# Patient Record
Sex: FEMALE | Race: WHITE | URBAN - METROPOLITAN AREA
[De-identification: names, ages, dates, MRNs, and addresses within clinical notes are randomized per-mention and may not be internally consistent; named-entity substitution may affect disease eponyms.]

---

## 2017-05-27 ENCOUNTER — EMERGENCY (EMERGENCY)
Facility: HOSPITAL | Age: 27
LOS: 0 days | Discharge: HOME | End: 2017-05-27
Admitting: PEDIATRICS

## 2017-06-28 DIAGNOSIS — S91.131A PUNCTURE WOUND WITHOUT FOREIGN BODY OF RIGHT GREAT TOE WITHOUT DAMAGE TO NAIL, INITIAL ENCOUNTER: ICD-10-CM
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
## 2017-06-28 DIAGNOSIS — G40.909 EPILEPSY, UNSPECIFIED, NOT INTRACTABLE, WITHOUT STATUS EPILEPTICUS: ICD-10-CM
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
## 2018-05-23 ENCOUNTER — OUTPATIENT (OUTPATIENT)
Dept: OUTPATIENT SERVICES | Facility: HOSPITAL | Age: 28
LOS: 1 days | Discharge: HOME | End: 2018-05-23

## 2018-05-23 DIAGNOSIS — R63.2 POLYPHAGIA: ICD-10-CM

## 2018-05-23 DIAGNOSIS — Z00.01 ENCOUNTER FOR GENERAL ADULT MEDICAL EXAMINATION WITH ABNORMAL FINDINGS: ICD-10-CM

## 2018-06-05 ENCOUNTER — OUTPATIENT (OUTPATIENT)
Dept: OUTPATIENT SERVICES | Facility: HOSPITAL | Age: 28
LOS: 1 days | Discharge: HOME | End: 2018-06-05

## 2018-06-05 DIAGNOSIS — R94.5 ABNORMAL RESULTS OF LIVER FUNCTION STUDIES: ICD-10-CM

## 2018-06-07 ENCOUNTER — TRANSCRIPTION ENCOUNTER (OUTPATIENT)
Age: 28
End: 2018-06-07

## 2018-06-14 ENCOUNTER — OUTPATIENT (OUTPATIENT)
Dept: OUTPATIENT SERVICES | Facility: HOSPITAL | Age: 28
LOS: 1 days | Discharge: HOME | End: 2018-06-14

## 2018-06-14 DIAGNOSIS — G04.91 MYELITIS, UNSPECIFIED: ICD-10-CM

## 2018-08-02 ENCOUNTER — OUTPATIENT (OUTPATIENT)
Dept: OUTPATIENT SERVICES | Facility: HOSPITAL | Age: 28
LOS: 1 days | Discharge: HOME | End: 2018-08-02

## 2018-08-02 DIAGNOSIS — R94.5 ABNORMAL RESULTS OF LIVER FUNCTION STUDIES: ICD-10-CM

## 2018-08-02 DIAGNOSIS — E55.9 VITAMIN D DEFICIENCY, UNSPECIFIED: ICD-10-CM

## 2018-08-02 DIAGNOSIS — R63.2 POLYPHAGIA: ICD-10-CM

## 2019-04-30 ENCOUNTER — TRANSCRIPTION ENCOUNTER (OUTPATIENT)
Age: 29
End: 2019-04-30

## 2019-07-14 ENCOUNTER — TRANSCRIPTION ENCOUNTER (OUTPATIENT)
Age: 29
End: 2019-07-14

## 2020-04-25 ENCOUNTER — MESSAGE (OUTPATIENT)
Age: 30
End: 2020-04-25

## 2020-05-09 ENCOUNTER — APPOINTMENT (OUTPATIENT)
Dept: DISASTER EMERGENCY | Facility: HOSPITAL | Age: 30
End: 2020-05-09

## 2020-05-09 LAB
SARS-COV-2 IGG SERPL IA-ACNC: 0.1 INDEX
SARS-COV-2 IGG SERPL QL IA: NEGATIVE

## 2021-05-24 ENCOUNTER — INPATIENT (INPATIENT)
Facility: HOSPITAL | Age: 31
LOS: 3 days | Discharge: HOME | End: 2021-05-28
Attending: OBSTETRICS & GYNECOLOGY | Admitting: OBSTETRICS & GYNECOLOGY
Payer: COMMERCIAL

## 2021-05-24 VITALS
DIASTOLIC BLOOD PRESSURE: 79 MMHG | HEIGHT: 66 IN | WEIGHT: 210.1 LBS | TEMPERATURE: 97 F | SYSTOLIC BLOOD PRESSURE: 123 MMHG | RESPIRATION RATE: 20 BRPM | HEART RATE: 96 BPM

## 2021-05-24 DIAGNOSIS — O36.5930 MATERNAL CARE FOR OTHER KNOWN OR SUSPECTED POOR FETAL GROWTH, THIRD TRIMESTER, NOT APPLICABLE OR UNSPECIFIED: ICD-10-CM

## 2021-05-24 DIAGNOSIS — O61.0 FAILED MEDICAL INDUCTION OF LABOR: ICD-10-CM

## 2021-05-24 DIAGNOSIS — Z28.09 IMMUNIZATION NOT CARRIED OUT BECAUSE OF OTHER CONTRAINDICATION: ICD-10-CM

## 2021-05-24 DIAGNOSIS — Z98.890 OTHER SPECIFIED POSTPROCEDURAL STATES: Chronic | ICD-10-CM

## 2021-05-24 DIAGNOSIS — Z3A.39 39 WEEKS GESTATION OF PREGNANCY: ICD-10-CM

## 2021-05-24 LAB
APPEARANCE UR: CLEAR — SIGNIFICANT CHANGE UP
BASOPHILS # BLD AUTO: 0.01 K/UL — SIGNIFICANT CHANGE UP (ref 0–0.2)
BASOPHILS NFR BLD AUTO: 0.1 % — SIGNIFICANT CHANGE UP (ref 0–1)
BILIRUB UR-MCNC: NEGATIVE — SIGNIFICANT CHANGE UP
BLD GP AB SCN SERPL QL: SIGNIFICANT CHANGE UP
COLOR SPEC: SIGNIFICANT CHANGE UP
DIFF PNL FLD: NEGATIVE — SIGNIFICANT CHANGE UP
EOSINOPHIL # BLD AUTO: 0.05 K/UL — SIGNIFICANT CHANGE UP (ref 0–0.7)
EOSINOPHIL NFR BLD AUTO: 0.5 % — SIGNIFICANT CHANGE UP (ref 0–8)
GLUCOSE UR QL: NEGATIVE — SIGNIFICANT CHANGE UP
HCT VFR BLD CALC: 36.5 % — LOW (ref 37–47)
HGB BLD-MCNC: 12.5 G/DL — SIGNIFICANT CHANGE UP (ref 12–16)
IMM GRANULOCYTES NFR BLD AUTO: 0.3 % — SIGNIFICANT CHANGE UP (ref 0.1–0.3)
KETONES UR-MCNC: NEGATIVE — SIGNIFICANT CHANGE UP
LEUKOCYTE ESTERASE UR-ACNC: NEGATIVE — SIGNIFICANT CHANGE UP
LYMPHOCYTES # BLD AUTO: 1.99 K/UL — SIGNIFICANT CHANGE UP (ref 1.2–3.4)
LYMPHOCYTES # BLD AUTO: 18.4 % — LOW (ref 20.5–51.1)
MCHC RBC-ENTMCNC: 31.4 PG — HIGH (ref 27–31)
MCHC RBC-ENTMCNC: 34.2 G/DL — SIGNIFICANT CHANGE UP (ref 32–37)
MCV RBC AUTO: 91.7 FL — SIGNIFICANT CHANGE UP (ref 81–99)
MONOCYTES # BLD AUTO: 0.74 K/UL — HIGH (ref 0.1–0.6)
MONOCYTES NFR BLD AUTO: 6.8 % — SIGNIFICANT CHANGE UP (ref 1.7–9.3)
NEUTROPHILS # BLD AUTO: 7.99 K/UL — HIGH (ref 1.4–6.5)
NEUTROPHILS NFR BLD AUTO: 73.9 % — SIGNIFICANT CHANGE UP (ref 42.2–75.2)
NITRITE UR-MCNC: NEGATIVE — SIGNIFICANT CHANGE UP
NRBC # BLD: 0 /100 WBCS — SIGNIFICANT CHANGE UP (ref 0–0)
PH UR: 7 — SIGNIFICANT CHANGE UP (ref 5–8)
PLATELET # BLD AUTO: 215 K/UL — SIGNIFICANT CHANGE UP (ref 130–400)
PRENATAL SYPHILIS TEST: SIGNIFICANT CHANGE UP
PROT UR-MCNC: NEGATIVE — SIGNIFICANT CHANGE UP
RBC # BLD: 3.98 M/UL — LOW (ref 4.2–5.4)
RBC # FLD: 12.8 % — SIGNIFICANT CHANGE UP (ref 11.5–14.5)
SP GR SPEC: 1 — LOW (ref 1.01–1.03)
UROBILINOGEN FLD QL: SIGNIFICANT CHANGE UP
WBC # BLD: 10.81 K/UL — HIGH (ref 4.8–10.8)
WBC # FLD AUTO: 10.81 K/UL — HIGH (ref 4.8–10.8)

## 2021-05-24 RX ORDER — DINOPROSTONE 10 MG/241MG
10 INSERT VAGINAL ONCE
Refills: 0 | Status: COMPLETED | OUTPATIENT
Start: 2021-05-24 | End: 2021-05-24

## 2021-05-24 RX ORDER — OXYTOCIN 10 UNIT/ML
333.33 VIAL (ML) INJECTION
Qty: 20 | Refills: 0 | Status: DISCONTINUED | OUTPATIENT
Start: 2021-05-24 | End: 2021-05-28

## 2021-05-24 RX ORDER — SODIUM CHLORIDE 9 MG/ML
1000 INJECTION, SOLUTION INTRAVENOUS
Refills: 0 | Status: DISCONTINUED | OUTPATIENT
Start: 2021-05-24 | End: 2021-05-27

## 2021-05-24 RX ADMIN — DINOPROSTONE 10 MILLIGRAM(S): 10 INSERT VAGINAL at 22:31

## 2021-05-24 NOTE — OB PROVIDER H&P - ASSESSMENT
31yo  at 39w1d, GBS negative, IOL for IUGR wtith normal dopplers, Last EFW on , 2604g, 5%.   -s/p negative covid swabs outpatient   -Admit to L+D  -Monitor EFM and TOCO   -IVF and labs  -Pain control PRN  -Clear liquid diet as tolerated  -IOL with cervidil     Dr. Weinberg aware

## 2021-05-24 NOTE — OB PROVIDER H&P - NSHPPHYSICALEXAM_GEN_ALL_CORE
T(C): 36.2 (05-24-21 @ 21:32), Max: 36.2 (05-24-21 @ 21:32)  HR: 96 (05-24-21 @ 21:32) (96 - 96)  BP: 123/79 (05-24-21 @ 21:32) (123/79 - 123/79)  RR: 20 (05-24-21 @ 21:32) (20 - 20)  BMI (kg/m2): 33.9 (05-24-21 @ 21:32)    Gen: A+OX3. NAD  Abd: Soft, Nontender. Gravid.  FHR: 150bpm /mod tatyana/accels+  TOCO: none  SVE:  C/L/P    EFW by Leopolds: 3000g

## 2021-05-24 NOTE — OB PROVIDER H&P - HISTORY OF PRESENT ILLNESS
31yo  at 39w1d, dated by 1st trimester sonogram, GBS negative, IOL at term. Denies VB, LOF ctx and reports good FM. Pregnancy complicated by IUGR, normal dopplers. Last EFW on , 2604g, 5%.

## 2021-05-24 NOTE — OB PROVIDER H&P - NSHPLABSRESULTS_GEN_ALL_CORE
Rubella immune  Varicella immune  VDRL Negative  HIV NR  Hep B NR  GBS negative  s/p TDAP   O Positive @36w6d: EFW 6%, Normal dopplers, normal fluid   @39w1d: IUGR 2604g, 5%, vertex    Rubella immune  Varicella immune  VDRL Negative  HIV NR  Hep B NR    5/6/21 GBS negative  s/p TDAP   O Positive    GCT 94      5/22 COVID 19 PCR negative  Partner COVID 19 PCR Negative

## 2021-05-25 LAB
AMPHET UR-MCNC: NEGATIVE — SIGNIFICANT CHANGE UP
BARBITURATES UR SCN-MCNC: NEGATIVE — SIGNIFICANT CHANGE UP
BENZODIAZ UR-MCNC: NEGATIVE — SIGNIFICANT CHANGE UP
BUPRENORPHINE SCREEN, URINE RESULT: NEGATIVE — SIGNIFICANT CHANGE UP
COCAINE METAB.OTHER UR-MCNC: NEGATIVE — SIGNIFICANT CHANGE UP
FENTANYL UR QL: NEGATIVE — SIGNIFICANT CHANGE UP
L&D DRUG SCREEN, URINE: SIGNIFICANT CHANGE UP
METHADONE UR-MCNC: NEGATIVE — SIGNIFICANT CHANGE UP
OPIATES UR-MCNC: NEGATIVE — SIGNIFICANT CHANGE UP
OXYCODONE UR-MCNC: NEGATIVE — SIGNIFICANT CHANGE UP
PCP UR-MCNC: NEGATIVE — SIGNIFICANT CHANGE UP
PROPOXYPHENE QUALITATIVE URINE RESULT: NEGATIVE — SIGNIFICANT CHANGE UP

## 2021-05-25 RX ORDER — DINOPROSTONE 10 MG/241MG
10 INSERT VAGINAL ONCE
Refills: 0 | Status: COMPLETED | OUTPATIENT
Start: 2021-05-25 | End: 2021-05-25

## 2021-05-25 RX ADMIN — SODIUM CHLORIDE 125 MILLILITER(S): 9 INJECTION, SOLUTION INTRAVENOUS at 07:21

## 2021-05-25 RX ADMIN — DINOPROSTONE 10 MILLIGRAM(S): 10 INSERT VAGINAL at 12:02

## 2021-05-25 NOTE — PROGRESS NOTE ADULT - SUBJECTIVE AND OBJECTIVE BOX
PGY I Note    S: Patient seen and examined at bedside. Doing well, does not desire pain control at this time.    Vital Signs Last 24 Hrs  T(C): 36.2 (24 May 2021 21:32), Max: 36.2 (24 May 2021 21:32)  T(F): 97.2 (24 May 2021 21:32), Max: 97.2 (24 May 2021 21:32)  HR: 67 (25 May 2021 02:18) (67 - 96)  BP: 94/55 (25 May 2021 02:18) (94/55 - 139/65)  RR: 20 (24 May 2021 21:32) (20 - 20)    EFM: 135/mod/+accels  TOCO: irritability  SVE: 0/0/-3 @2230 per Dr. Dyer, cervidil in place    Labs:                        12.5   10.81 )-----------( 215      ( 24 May 2021 21:49 )             36.5             ABO RH Interpretation: O POS (21 @ 21:46)    Urinalysis Basic - ( 24 May 2021 21:50 )    Color: Light Yellow / Appearance: Clear / S.005 / pH: x  Gluc: x / Ketone: Negative  / Bili: Negative / Urobili: <2 mg/dL   Blood: x / Protein: Negative / Nitrite: Negative   Leuk Esterase: Negative / RBC: x / WBC x   Sq Epi: x / Non Sq Epi: x / Bacteria: x    Prenatal Syphilis Test: Nonreact (21 @ 21:49)    UDS: pending  Covid: neg    Meds:  Cervidil @2231

## 2021-05-25 NOTE — PROGRESS NOTE ADULT - SUBJECTIVE AND OBJECTIVE BOX
Pt evaluated at bedside, comfortable at this time.    T(C): 36.7 (21 @ 07:17), Max: 36.7 (21 @ 07:17)  HR: 74 (21 @ 10:19) (67 - 96)  BP: 105/58 (21 @ 10:19) (94/55 - 139/65)  RR: 20 (21 @ 21:32) (20 - 20)    VE: L/C/P  Cervidil Placed  Ctx: none noted  FHR: 145 moderate variablity, Cat I    Meds:   Cervidil #1 @ 2231    A/P: 30y.o.  @ 39.1wks IOL for IUGR, GBS negative  - Continuous efm and toco  - Pain management PRN  - Dr. Russo aware

## 2021-05-25 NOTE — OB PROVIDER LABOR PROGRESS NOTE - ASSESSMENT
@ 41+ weeks for IOL for late term   -for cervidil #2   -discussed options of repeat cervidil vs. pitocin   -r/b reviewed   -will place cervidil #2 after patient eats and ambulates  @ 39 + with IUGR <5%Ile with normal dopplers   Cat 1 FHR  -for cervidil #2   -discussed options of repeat cervidil vs. pitocin   -r/b reviewed   -will place cervidil #2 after patient eats and ambulates

## 2021-05-26 ENCOUNTER — RESULT REVIEW (OUTPATIENT)
Age: 31
End: 2021-05-26

## 2021-05-26 PROCEDURE — 88307 TISSUE EXAM BY PATHOLOGIST: CPT | Mod: 26

## 2021-05-26 RX ORDER — FAMOTIDINE 10 MG/ML
20 INJECTION INTRAVENOUS DAILY
Refills: 0 | Status: DISCONTINUED | OUTPATIENT
Start: 2021-05-26 | End: 2021-05-28

## 2021-05-26 RX ORDER — ACETAMINOPHEN 500 MG
975 TABLET ORAL
Refills: 0 | Status: DISCONTINUED | OUTPATIENT
Start: 2021-05-26 | End: 2021-05-28

## 2021-05-26 RX ORDER — NALOXONE HYDROCHLORIDE 4 MG/.1ML
0.1 SPRAY NASAL
Refills: 0 | Status: DISCONTINUED | OUTPATIENT
Start: 2021-05-26 | End: 2021-05-28

## 2021-05-26 RX ORDER — ONDANSETRON 8 MG/1
4 TABLET, FILM COATED ORAL EVERY 6 HOURS
Refills: 0 | Status: DISCONTINUED | OUTPATIENT
Start: 2021-05-26 | End: 2021-05-28

## 2021-05-26 RX ORDER — MORPHINE SULFATE 50 MG/1
3 CAPSULE, EXTENDED RELEASE ORAL ONCE
Refills: 0 | Status: DISCONTINUED | OUTPATIENT
Start: 2021-05-26 | End: 2021-05-28

## 2021-05-26 RX ORDER — KETOROLAC TROMETHAMINE 30 MG/ML
30 SYRINGE (ML) INJECTION EVERY 6 HOURS
Refills: 0 | Status: DISCONTINUED | OUTPATIENT
Start: 2021-05-26 | End: 2021-05-27

## 2021-05-26 RX ORDER — SIMETHICONE 80 MG/1
80 TABLET, CHEWABLE ORAL EVERY 4 HOURS
Refills: 0 | Status: DISCONTINUED | OUTPATIENT
Start: 2021-05-26 | End: 2021-05-28

## 2021-05-26 RX ORDER — IBUPROFEN 200 MG
600 TABLET ORAL EVERY 6 HOURS
Refills: 0 | Status: DISCONTINUED | OUTPATIENT
Start: 2021-05-26 | End: 2021-05-28

## 2021-05-26 RX ORDER — ACETAMINOPHEN 500 MG
975 TABLET ORAL
Refills: 0 | Status: DISCONTINUED | OUTPATIENT
Start: 2021-05-26 | End: 2021-05-27

## 2021-05-26 RX ORDER — DIPHENHYDRAMINE HCL 50 MG
25 CAPSULE ORAL EVERY 6 HOURS
Refills: 0 | Status: COMPLETED | OUTPATIENT
Start: 2021-05-26 | End: 2022-04-24

## 2021-05-26 RX ORDER — OXYCODONE HYDROCHLORIDE 5 MG/1
5 TABLET ORAL ONCE
Refills: 0 | Status: DISCONTINUED | OUTPATIENT
Start: 2021-05-26 | End: 2021-05-28

## 2021-05-26 RX ORDER — OXYTOCIN 10 UNIT/ML
2 VIAL (ML) INJECTION
Qty: 30 | Refills: 0 | Status: DISCONTINUED | OUTPATIENT
Start: 2021-05-26 | End: 2021-05-26

## 2021-05-26 RX ORDER — DEXAMETHASONE 0.5 MG/5ML
4 ELIXIR ORAL EVERY 6 HOURS
Refills: 0 | Status: DISCONTINUED | OUTPATIENT
Start: 2021-05-26 | End: 2021-05-28

## 2021-05-26 RX ORDER — OXYCODONE HYDROCHLORIDE 5 MG/1
5 TABLET ORAL
Refills: 0 | Status: DISCONTINUED | OUTPATIENT
Start: 2021-05-26 | End: 2021-05-28

## 2021-05-26 RX ORDER — CEFAZOLIN SODIUM 1 G
2000 VIAL (EA) INJECTION ONCE
Refills: 0 | Status: COMPLETED | OUTPATIENT
Start: 2021-05-26 | End: 2021-05-26

## 2021-05-26 RX ORDER — MAGNESIUM HYDROXIDE 400 MG/1
30 TABLET, CHEWABLE ORAL
Refills: 0 | Status: DISCONTINUED | OUTPATIENT
Start: 2021-05-26 | End: 2021-05-28

## 2021-05-26 RX ORDER — OXYTOCIN 10 UNIT/ML
2 VIAL (ML) INJECTION
Qty: 30 | Refills: 0 | Status: DISCONTINUED | OUTPATIENT
Start: 2021-05-26 | End: 2021-05-28

## 2021-05-26 RX ORDER — LANOLIN
1 OINTMENT (GRAM) TOPICAL EVERY 6 HOURS
Refills: 0 | Status: DISCONTINUED | OUTPATIENT
Start: 2021-05-26 | End: 2021-05-28

## 2021-05-26 RX ORDER — IBUPROFEN 200 MG
600 TABLET ORAL EVERY 6 HOURS
Refills: 0 | Status: COMPLETED | OUTPATIENT
Start: 2021-05-26 | End: 2022-04-24

## 2021-05-26 RX ORDER — AZITHROMYCIN 500 MG/1
500 TABLET, FILM COATED ORAL ONCE
Refills: 0 | Status: COMPLETED | OUTPATIENT
Start: 2021-05-26 | End: 2021-05-26

## 2021-05-26 RX ORDER — ENOXAPARIN SODIUM 100 MG/ML
40 INJECTION SUBCUTANEOUS EVERY 24 HOURS
Refills: 0 | Status: DISCONTINUED | OUTPATIENT
Start: 2021-05-27 | End: 2021-05-28

## 2021-05-26 RX ORDER — TETANUS TOXOID, REDUCED DIPHTHERIA TOXOID AND ACELLULAR PERTUSSIS VACCINE, ADSORBED 5; 2.5; 8; 8; 2.5 [IU]/.5ML; [IU]/.5ML; UG/.5ML; UG/.5ML; UG/.5ML
0.5 SUSPENSION INTRAMUSCULAR ONCE
Refills: 0 | Status: DISCONTINUED | OUTPATIENT
Start: 2021-05-26 | End: 2021-05-28

## 2021-05-26 RX ORDER — CITRIC ACID/SODIUM CITRATE 300-500 MG
30 SOLUTION, ORAL ORAL ONCE
Refills: 0 | Status: COMPLETED | OUTPATIENT
Start: 2021-05-26 | End: 2021-05-26

## 2021-05-26 RX ORDER — METOCLOPRAMIDE HCL 10 MG
10 TABLET ORAL ONCE
Refills: 0 | Status: DISCONTINUED | OUTPATIENT
Start: 2021-05-26 | End: 2021-05-28

## 2021-05-26 RX ORDER — OXYTOCIN 10 UNIT/ML
333.33 VIAL (ML) INJECTION
Qty: 20 | Refills: 0 | Status: DISCONTINUED | OUTPATIENT
Start: 2021-05-26 | End: 2021-05-28

## 2021-05-26 RX ADMIN — Medication 100 MILLIGRAM(S): at 20:30

## 2021-05-26 RX ADMIN — Medication 2 MILLIUNIT(S)/MIN: at 17:08

## 2021-05-26 RX ADMIN — AZITHROMYCIN 255 MILLIGRAM(S): 500 TABLET, FILM COATED ORAL at 20:48

## 2021-05-26 RX ADMIN — Medication 2 MILLIUNIT(S)/MIN: at 02:47

## 2021-05-26 NOTE — CHART NOTE - NSCHARTNOTEFT_GEN_A_CORE
Patient at bedside. Cervidil removed    0.5/80/0 and soft on exam     will give patient 1 hour to rest ambulate and shower.    will start pitocin in 1 hour    Dr. Russo aware

## 2021-05-26 NOTE — BRIEF OPERATIVE NOTE - OPERATION/FINDINGS
Normal uterus, tubes and ovaries bilaterally. Viable male infant weighing 5-10 delivered in vertex position, APGARs 9/9. Low transverse uterine incision. Normal uterus, tubes and ovaries bilaterally. Viable male infant weighing 5-10 delivered in vertex position, APGARs 9/9.

## 2021-05-26 NOTE — CHART NOTE - NSCHARTNOTEFT_GEN_A_CORE
Pt seen and examined at bedside.  Cervical exam remains unchanged at 2.5/50/-3.    At this time; risk, benefits, and alternatives were discussed with the patient regarding proceeding with a  versus continuing the induction of labor. Pt understood the risks, alternatives, and benefits of both options and has elected to proceed with a primary          Plan:  -patient on call to OR  -Azizthromycin and Ancef  -NPO  -skin prep  -peds and anesthesia to be made aware  -continuous EFM/toco    Dr. Albarran bedside

## 2021-05-26 NOTE — BRIEF OPERATIVE NOTE - NSICDXBRIEFPOSTOP_GEN_ALL_CORE_FT
POST-OP DIAGNOSIS:  IUGR,  26-May-2021 22:11:50  Haley Baig  Failed induction of labor 26-May-2021 22:11:48  Haley Baig

## 2021-05-26 NOTE — PROGRESS NOTE ADULT - SUBJECTIVE AND OBJECTIVE BOX
PGY1 Note    Patient seen at bedside for labor progression. No complaints at the moment.    T(F): 97.88 ( @ 19:32), Max: 97.88 ( @ 19:32)  HR: 93 ( @ 13:38)  BP: 113/58 ( @ 13:38) (76/45 - 150/61)    EFM: 135/mod tatyana/+accels   TOCO: irregular  SVE: deferred, last exam @06 0.5/80/-2, balloon placed @0720 (by )    Medications:  dinoprostone Insert: 10 ( @ 22:31)  dinoprostone Insert: 10 ( @ 12:02)  lactated ringers.: 125 ( @ 21:24)  oxytocin Infusion.: 2 ( @ 01:20)  cervical ripening balloon @0720    Labs:                        12.5   10.81 )-----------( 215      ( 24 May 2021 21:49 )             36.5             Urinalysis Basic - ( 24 May 2021 21:50 )    Color: Light Yellow / Appearance: Clear / S.005 / pH: x  Gluc: x / Ketone: Negative  / Bili: Negative / Urobili: <2 mg/dL   Blood: x / Protein: Negative / Nitrite: Negative   Leuk Esterase: Negative / RBC: x / WBC x   Sq Epi: x / Non Sq Epi: x / Bacteria: x

## 2021-05-26 NOTE — PROGRESS NOTE ADULT - SUBJECTIVE AND OBJECTIVE BOX
PGY1 Note    Patient seen at bedside for labor progression by . S/P balloon removal. No complaints at the moment.    T(F): 97.88 ( @ 19:32), Max: 97.88 ( @ 19:32)  HR: 80 ( @ 17:28)  BP: 120/75 ( @ 17:28) (76/45 - 150/61)    EFM: 150/mod tatyana/+accels   TOCO: irregular  SVE: 2.5/50/-3/vtx/intact s/p balloon removal (by )    Medications:  dinoprostone Insert: 10 ( @ 22:31)  dinoprostone Insert: 10 ( @ 12:02)  lactated ringers.: 125 ( @ 21:24)  oxytocin Infusion.: 2 ( @ 01:20)  oxytocin Infusion.: 2 ( @ 16:41), currently 6mU/min       Labs:                        12.5   10.81 )-----------( 215      ( 24 May 2021 21:49 )             36.5             Urinalysis Basic - ( 24 May 2021 21:50 )    Color: Light Yellow / Appearance: Clear / S.005 / pH: x  Gluc: x / Ketone: Negative  / Bili: Negative / Urobili: <2 mg/dL   Blood: x / Protein: Negative / Nitrite: Negative   Leuk Esterase: Negative / RBC: x / WBC x   Sq Epi: x / Non Sq Epi: x / Bacteria: x

## 2021-05-26 NOTE — BRIEF OPERATIVE NOTE - NSICDXBRIEFPREOP_GEN_ALL_CORE_FT
PRE-OP DIAGNOSIS:  Failed induction of labor 26-May-2021 22:11:23  Haley Baig  IUGR,  26-May-2021 22:11:44  Haley Baig

## 2021-05-26 NOTE — PROGRESS NOTE ADULT - SUBJECTIVE AND OBJECTIVE BOX
PGY1 Note    Patient seen at bedside for labor progression. Reports more pain, requesting epidural.    T(F): 98.06 ( @ 19:05), Max: 98.06 ( @ 19:05)  HR: 68 ( @ 19:13)  BP: 148/82 ( @ 19:13) (76/45 - 150/61)  RR: 18 ( @ 19:05)    EFM: 145/mod tatyana/+accels   TOCO: q3min   SVE: deferred, last exam @ 2.5/50/-3/vtx/ruptured s/p AROM, clear by     Medications:  dinoprostone Insert: 10 ( @ 22:31)  dinoprostone Insert: 10 ( @ 12:02)  lactated ringers.: 125 ( @ 21:24)  oxytocin Infusion.: 2 ( @ 01:20)  oxytocin Infusion.: 2 ( @ 16:41), currently @8mU/min       Labs:                        12.5   10.81 )-----------( 215      ( 24 May 2021 21:49 )             36.5             Urinalysis Basic - ( 24 May 2021 21:50 )    Color: Light Yellow / Appearance: Clear / S.005 / pH: x  Gluc: x / Ketone: Negative  / Bili: Negative / Urobili: <2 mg/dL   Blood: x / Protein: Negative / Nitrite: Negative   Leuk Esterase: Negative / RBC: x / WBC x   Sq Epi: x / Non Sq Epi: x / Bacteria: x     PGY1 Note    Patient seen at bedside for labor progression. Reports more pain, requesting epidural.    T(F): 98.06 ( @ 19:05), Max: 98.06 ( @ 19:05)  HR: 68 ( @ 19:13)  BP: 148/82 ( @ 19:13) (76/45 - 150/61)  RR: 18 ( @ 19:05)    EFM: 145/mod tatyana/+accels/intermittent variables   TOCO: q3min   SVE: deferred, last exam @ 2.5/50/-3/vtx/ruptured s/p AROM, clear by     Medications:  dinoprostone Insert: 10 ( @ 22:31)  dinoprostone Insert: 10 ( @ 12:02)  lactated ringers.: 125 ( @ 21:24)  oxytocin Infusion.: 2 ( @ 01:20)  oxytocin Infusion.: 2 ( @ 16:41), currently @8mU/min       Labs:                        12.5   10.81 )-----------( 215      ( 24 May 2021 21:49 )             36.5             Urinalysis Basic - ( 24 May 2021 21:50 )    Color: Light Yellow / Appearance: Clear / S.005 / pH: x  Gluc: x / Ketone: Negative  / Bili: Negative / Urobili: <2 mg/dL   Blood: x / Protein: Negative / Nitrite: Negative   Leuk Esterase: Negative / RBC: x / WBC x   Sq Epi: x / Non Sq Epi: x / Bacteria: x

## 2021-05-27 LAB
BASOPHILS # BLD AUTO: 0.01 K/UL — SIGNIFICANT CHANGE UP (ref 0–0.2)
BASOPHILS NFR BLD AUTO: 0.1 % — SIGNIFICANT CHANGE UP (ref 0–1)
EOSINOPHIL # BLD AUTO: 0.01 K/UL — SIGNIFICANT CHANGE UP (ref 0–0.7)
EOSINOPHIL NFR BLD AUTO: 0.1 % — SIGNIFICANT CHANGE UP (ref 0–8)
HCT VFR BLD CALC: 34.2 % — LOW (ref 37–47)
HGB BLD-MCNC: 11.3 G/DL — LOW (ref 12–16)
IMM GRANULOCYTES NFR BLD AUTO: 0.3 % — SIGNIFICANT CHANGE UP (ref 0.1–0.3)
LYMPHOCYTES # BLD AUTO: 1.16 K/UL — LOW (ref 1.2–3.4)
LYMPHOCYTES # BLD AUTO: 9.7 % — LOW (ref 20.5–51.1)
MCHC RBC-ENTMCNC: 31 PG — SIGNIFICANT CHANGE UP (ref 27–31)
MCHC RBC-ENTMCNC: 33 G/DL — SIGNIFICANT CHANGE UP (ref 32–37)
MCV RBC AUTO: 93.7 FL — SIGNIFICANT CHANGE UP (ref 81–99)
MONOCYTES # BLD AUTO: 0.71 K/UL — HIGH (ref 0.1–0.6)
MONOCYTES NFR BLD AUTO: 5.9 % — SIGNIFICANT CHANGE UP (ref 1.7–9.3)
NEUTROPHILS # BLD AUTO: 10.01 K/UL — HIGH (ref 1.4–6.5)
NEUTROPHILS NFR BLD AUTO: 83.9 % — HIGH (ref 42.2–75.2)
NRBC # BLD: 0 /100 WBCS — SIGNIFICANT CHANGE UP (ref 0–0)
PLATELET # BLD AUTO: 193 K/UL — SIGNIFICANT CHANGE UP (ref 130–400)
RBC # BLD: 3.65 M/UL — LOW (ref 4.2–5.4)
RBC # FLD: 13.1 % — SIGNIFICANT CHANGE UP (ref 11.5–14.5)
WBC # BLD: 11.94 K/UL — HIGH (ref 4.8–10.8)
WBC # FLD AUTO: 11.94 K/UL — HIGH (ref 4.8–10.8)

## 2021-05-27 RX ORDER — DIPHENHYDRAMINE HCL 50 MG
25 CAPSULE ORAL EVERY 6 HOURS
Refills: 0 | Status: DISCONTINUED | OUTPATIENT
Start: 2021-05-27 | End: 2021-05-28

## 2021-05-27 RX ORDER — IBUPROFEN 200 MG
600 TABLET ORAL EVERY 6 HOURS
Refills: 0 | Status: DISCONTINUED | OUTPATIENT
Start: 2021-05-27 | End: 2021-05-28

## 2021-05-27 RX ADMIN — Medication 975 MILLIGRAM(S): at 17:50

## 2021-05-27 RX ADMIN — Medication 30 MILLIGRAM(S): at 04:06

## 2021-05-27 RX ADMIN — Medication 30 MILLIGRAM(S): at 03:36

## 2021-05-27 RX ADMIN — SIMETHICONE 80 MILLIGRAM(S): 80 TABLET, CHEWABLE ORAL at 03:33

## 2021-05-27 RX ADMIN — Medication 30 MILLIGRAM(S): at 13:57

## 2021-05-27 RX ADMIN — Medication 975 MILLIGRAM(S): at 11:42

## 2021-05-27 RX ADMIN — SIMETHICONE 80 MILLIGRAM(S): 80 TABLET, CHEWABLE ORAL at 17:55

## 2021-05-27 RX ADMIN — ENOXAPARIN SODIUM 40 MILLIGRAM(S): 100 INJECTION SUBCUTANEOUS at 09:36

## 2021-05-27 RX ADMIN — SIMETHICONE 80 MILLIGRAM(S): 80 TABLET, CHEWABLE ORAL at 13:49

## 2021-05-27 RX ADMIN — Medication 975 MILLIGRAM(S): at 06:48

## 2021-05-27 RX ADMIN — SIMETHICONE 80 MILLIGRAM(S): 80 TABLET, CHEWABLE ORAL at 09:36

## 2021-05-27 RX ADMIN — Medication 30 MILLIGRAM(S): at 09:35

## 2021-05-27 RX ADMIN — Medication 30 MILLIGRAM(S): at 10:00

## 2021-05-27 RX ADMIN — Medication 25 MILLIGRAM(S): at 03:51

## 2021-05-27 RX ADMIN — SIMETHICONE 80 MILLIGRAM(S): 80 TABLET, CHEWABLE ORAL at 06:30

## 2021-05-27 RX ADMIN — Medication 975 MILLIGRAM(S): at 06:29

## 2021-05-27 RX ADMIN — Medication 975 MILLIGRAM(S): at 12:30

## 2021-05-27 RX ADMIN — Medication 30 MILLIGRAM(S): at 14:30

## 2021-05-27 NOTE — PROGRESS NOTE ADULT - SUBJECTIVE AND OBJECTIVE BOX
PGY1 Note:  Patient seen and examined at bedside, recovering well, no acute complaints. Denies fever, chills, chest pain, SOB, nausea, vomiting, LE pain. Pain well-controlled with medication. Minimal bleeding, blanc in place, tolerating PO. Not yet voiding, ambulating, passed flatus.     Physical Exam  Vital Signs Last 24 Hrs  T(C): 36.2 (27 May 2021 01:54), Max: 36.7 (26 May 2021 19:05)  T(F): 97.1 (27 May 2021 01:54), Max: 98.06 (26 May 2021 19:05)  HR: 82 (27 May 2021 01:54) (65 - 108)  BP: 119/59 (27 May 2021 01:54) (95/65 - 152/82) isolated elevated x2  RR: 18 (27 May 2021 01:54) (18 - 18)  SpO2: 99% (27 May 2021 00:00) (92% - 100%)    Gen: AAOx3, NAD  Heart: Normal S1S2, RRR, no m/r/g  Lungs: CTA b/l, no r/r/w   Fundus: firm, below umbilicus   Wound: steris in place c/d/i   Abd: Soft, nontender, nondistended, BS+  Lochia: minimal  Ext: No calf tenderness, no swelling, SCDs in place  UO: ([id number]) 700cc    Labs:                        12.5   10.81 )-----------( 215      ( 24 May 2021 21:49 )             36.5        acetaminophen   Tablet .. 975 milliGRAM(s) Oral <User Schedule>  dexAMETHasone  Injectable 4 milliGRAM(s) IV Push every 6 hours PRN  diphenhydrAMINE 25 milliGRAM(s) Oral every 6 hours PRN  diphtheria/tetanus/pertussis (acellular) Vaccine (ADAcel) 0.5 milliLiter(s) IntraMuscular once  enoxaparin Injectable 40 milliGRAM(s) SubCutaneous every 24 hours  famotidine    Tablet 20 milliGRAM(s) Oral daily  ibuprofen  Tablet. 600 milliGRAM(s) Oral every 6 hours  ibuprofen  Tablet. 600 milliGRAM(s) Oral every 6 hours  ketorolac   Injectable 30 milliGRAM(s) IV Push every 6 hours  lanolin Ointment 1 Application(s) Topical every 6 hours PRN  magnesium hydroxide Suspension 30 milliLiter(s) Oral two times a day PRN  metoclopramide Injectable 10 milliGRAM(s) IV Push once  morphine PF Epidural 3 milliGRAM(s) Epidural once  naloxone Injectable 0.1 milliGRAM(s) IV Push every 3 minutes PRN  ondansetron Injectable 4 milliGRAM(s) IV Push every 6 hours PRN  oxyCODONE    IR 5 milliGRAM(s) Oral every 3 hours PRN  oxyCODONE    IR 5 milliGRAM(s) Oral once PRN  oxyCODONE    IR 5 milliGRAM(s) Oral every 3 hours PRN  oxyCODONE    IR 5 milliGRAM(s) Oral once PRN  oxytocin Infusion 333.333 milliUNIT(s)/Min IV Continuous <Continuous>  oxytocin Infusion 333.333 milliUNIT(s)/Min IV Continuous <Continuous>  oxytocin Infusion. 2 milliUNIT(s)/Min IV Continuous <Continuous>  simethicone 80 milliGRAM(s) Chew every 4 hours

## 2021-05-27 NOTE — PROGRESS NOTE ADULT - ASSESSMENT
30y.o.  @ 39.3wks IOL for IUGR, GBS negative, s/p balloon placement, doing well  -Continue current management   -Pain management prn  -Continuous EFM/toco  -Reevaluate      and  aware    
30y now P1 s/p primary LTCS for failed IOL, POD#1, recovering well     - encourage ambulation, PO hydration  - f/u 1100 CBC   - blanc removed, TOV 1200  - monitor vitals, bleeding   - regular diet  - encourage incentive spirometry   - pain mgmt prn   - simethicone  - routine postop care   - lovenox     Dr. Albarran and Dr. Yancey aware
30y now P1 s/p primary LTCS for failed IOL, POD#1, recovering well     - encourage ambulation, PO hydration  - f/u 1100 CBC   - monitor vitals, bleeding   - regular diet  - encourage incentive spirometry   - pain mgmt prn   - simethicone  - routine postop care   - lovenox     Dr. Albarran and Dr. Dyer aware
30y.o.  @ 39.3wks IOL for IUGR, GBS negative, s/p balloon now removed, on pitocin, doing well   -Continue current management   -Pain management prn  -Continuous EFM/toco  -Reevaluate      and  aware  
29yo  at 39w2d, GBS negative, cervidil in place for IOL for IUGR (5%) with normal dopplers, doing well.    - Cont EFM/[place]  - IV Hydration  - Pain Management prn  - Monitor vitals  - Clear Liquids  - Cervidil in place until  @1030    Dr. Russo and Dr. Dyer aware
30y.o.  @ 39.3wks IOL for IUGR, GBS negative, s/p balloon now removed, on pitocin, s/p AROM, doing well   -Continue current management   -Pain management prn, anesthesia consulted for epidural placement   -Continuous EFM/toco  -Reevaluate      and  aware

## 2021-05-27 NOTE — PROGRESS NOTE ADULT - SUBJECTIVE AND OBJECTIVE BOX
PGY1 Note:  Patient seen and examined at bedside, recovering well, no acute complaints. Denies fever, chills, chest pain, SOB, nausea, vomiting, LE pain. Pain well-controlled with medication. Minimal bleeding, blanc in place, tolerating PO. Not yet voiding, ambulating, passed flatus.     Physical Exam  Vital Signs Last 24 Hrs  T(C): 36.2 (27 May 2021 01:54), Max: 36.7 (26 May 2021 19:05)  T(F): 97.1 (27 May 2021 01:54), Max: 98.06 (26 May 2021 19:05)  HR: 82 (27 May 2021 01:54) (65 - 108)  BP: 119/59 (27 May 2021 01:54) (76/45 - 152/82)  RR: 18 (27 May 2021 01:54) (18 - 18)  SpO2: 99% (27 May 2021 00:00) (92% - 100%)    Gen: AOx3, NAD  Heart: Normal S1S2, RRR, no m/r/g  Lungs: CTA b/l, no r/r/w   Fundus: firm, below umbilicus   Wound: dressing in place c/d/i   Abd: Soft, nontender, nondistended, BS+  Lochia: minimal  Ext: No calf tenderness, no swelling, SCDs in place  UO: ([id number]) 200cc    Labs:                        12.5   10.81 )-----------( 215      ( 24 May 2021 21:49 )             36.5        acetaminophen   Tablet .. 975 milliGRAM(s) Oral <User Schedule>  acetaminophen   Tablet .. 975 milliGRAM(s) Oral <User Schedule>  dexAMETHasone  Injectable 4 milliGRAM(s) IV Push every 6 hours PRN  diphenhydrAMINE 25 milliGRAM(s) Oral every 6 hours PRN  diphtheria/tetanus/pertussis (acellular) Vaccine (ADAcel) 0.5 milliLiter(s) IntraMuscular once  enoxaparin Injectable 40 milliGRAM(s) SubCutaneous every 24 hours  famotidine    Tablet 20 milliGRAM(s) Oral daily  ibuprofen  Tablet. 600 milliGRAM(s) Oral every 6 hours  ibuprofen  Tablet. 600 milliGRAM(s) Oral every 6 hours  ketorolac   Injectable 30 milliGRAM(s) IV Push every 6 hours  lactated ringers. 1000 milliLiter(s) IV Continuous <Continuous>  lanolin Ointment 1 Application(s) Topical every 6 hours PRN  magnesium hydroxide Suspension 30 milliLiter(s) Oral two times a day PRN  metoclopramide Injectable 10 milliGRAM(s) IV Push once  morphine PF Epidural 3 milliGRAM(s) Epidural once  naloxone Injectable 0.1 milliGRAM(s) IV Push every 3 minutes PRN  ondansetron Injectable 4 milliGRAM(s) IV Push every 6 hours PRN  oxyCODONE    IR 5 milliGRAM(s) Oral every 3 hours PRN  oxyCODONE    IR 5 milliGRAM(s) Oral once PRN  oxyCODONE    IR 5 milliGRAM(s) Oral every 3 hours PRN  oxyCODONE    IR 5 milliGRAM(s) Oral once PRN  oxytocin Infusion 333.333 milliUNIT(s)/Min IV Continuous <Continuous>  oxytocin Infusion 333.333 milliUNIT(s)/Min IV Continuous <Continuous>  oxytocin Infusion. 2 milliUNIT(s)/Min IV Continuous <Continuous>  simethicone 80 milliGRAM(s) Chew every 4 hours

## 2021-05-28 ENCOUNTER — TRANSCRIPTION ENCOUNTER (OUTPATIENT)
Age: 31
End: 2021-05-28

## 2021-05-28 VITALS
DIASTOLIC BLOOD PRESSURE: 77 MMHG | RESPIRATION RATE: 18 BRPM | SYSTOLIC BLOOD PRESSURE: 114 MMHG | HEART RATE: 88 BPM | TEMPERATURE: 96 F

## 2021-05-28 LAB
COVID-19 SPIKE DOMAIN AB INTERP: NEGATIVE — SIGNIFICANT CHANGE UP
COVID-19 SPIKE DOMAIN ANTIBODY RESULT: 0.4 U/ML — SIGNIFICANT CHANGE UP
HIV 1+2 AB+HIV1 P24 AG SERPL QL IA: SIGNIFICANT CHANGE UP
SARS-COV-2 IGG+IGM SERPL QL IA: 0.4 U/ML — SIGNIFICANT CHANGE UP
SARS-COV-2 IGG+IGM SERPL QL IA: NEGATIVE — SIGNIFICANT CHANGE UP
SURGICAL PATHOLOGY STUDY: SIGNIFICANT CHANGE UP

## 2021-05-28 RX ORDER — IBUPROFEN 200 MG
1 TABLET ORAL
Qty: 0 | Refills: 0 | DISCHARGE
Start: 2021-05-28

## 2021-05-28 RX ORDER — ACETAMINOPHEN 500 MG
3 TABLET ORAL
Qty: 0 | Refills: 0 | DISCHARGE
Start: 2021-05-28

## 2021-05-28 RX ADMIN — Medication 600 MILLIGRAM(S): at 01:20

## 2021-05-28 RX ADMIN — Medication 975 MILLIGRAM(S): at 10:05

## 2021-05-28 RX ADMIN — SIMETHICONE 80 MILLIGRAM(S): 80 TABLET, CHEWABLE ORAL at 03:44

## 2021-05-28 RX ADMIN — Medication 600 MILLIGRAM(S): at 00:45

## 2021-05-28 RX ADMIN — Medication 600 MILLIGRAM(S): at 12:41

## 2021-05-28 RX ADMIN — Medication 975 MILLIGRAM(S): at 03:44

## 2021-05-28 RX ADMIN — Medication 600 MILLIGRAM(S): at 06:02

## 2021-05-28 RX ADMIN — Medication 975 MILLIGRAM(S): at 14:31

## 2021-05-28 RX ADMIN — SIMETHICONE 80 MILLIGRAM(S): 80 TABLET, CHEWABLE ORAL at 14:31

## 2021-05-28 RX ADMIN — SIMETHICONE 80 MILLIGRAM(S): 80 TABLET, CHEWABLE ORAL at 09:34

## 2021-05-28 RX ADMIN — SIMETHICONE 80 MILLIGRAM(S): 80 TABLET, CHEWABLE ORAL at 06:02

## 2021-05-28 RX ADMIN — Medication 600 MILLIGRAM(S): at 12:11

## 2021-05-28 RX ADMIN — Medication 975 MILLIGRAM(S): at 15:01

## 2021-05-28 RX ADMIN — Medication 600 MILLIGRAM(S): at 06:41

## 2021-05-28 RX ADMIN — Medication 975 MILLIGRAM(S): at 09:35

## 2021-05-28 NOTE — DISCHARGE NOTE OB - CARE PLAN
Principal Discharge DX:	 delivery delivered  Goal:	happy and healthy mother and baby  Assessment and plan of treatment:	If you experience any of the following, please notify your provider:  -fever >100.4F  -increased vaginal bleeding or clotting (saturating a pad an hour)  -foul smelling discharge or bloody discharge from your incision site  -severe abdominal, vaginal, or rectal pain   -persistent headache or vision changes  -swollen areas on your legs that are red, hot, or painful   -swollen, hot, painful areas and/or streaks on your breasts  -cracked or bleeding nipples  -mood swings, depression, or crying spells lasting more than 3 days     Nothing in the vagina for 6 weeks (no douching, sex or tampons). No baths, you may shower.

## 2021-05-28 NOTE — DISCHARGE NOTE OB - PATIENT PORTAL LINK FT
You can access the FollowMyHealth Patient Portal offered by [facility] [place] by registering at the following website: [email or web address]. By joining [employer]’s FollowMyHealth portal, you will also be able to view your health information using other applications (apps) compatible with our system.

## 2021-05-28 NOTE — DISCHARGE NOTE OB - HOSPITAL COURSE
Patient is s/p uncomplicated LTCS. She had an uncomplicated postpartum course and has met her postpartum milestones appropriately.  Vitals are stable for discharge.

## 2021-05-28 NOTE — DISCHARGE NOTE OB - PLAN OF CARE
happy and healthy mother and baby If you experience any of the following, please notify your provider:  -fever >100.4F  -increased vaginal bleeding or clotting (saturating a pad an hour)  -foul smelling discharge or bloody discharge from your incision site  -severe abdominal, vaginal, or rectal pain   -persistent headache or vision changes  -swollen areas on your legs that are red, hot, or painful   -swollen, hot, painful areas and/or streaks on your breasts  -cracked or bleeding nipples  -mood swings, depression, or crying spells lasting more than 3 days     Nothing in the vagina for 6 weeks (no douching, sex or tampons). No baths, you may shower.

## 2021-05-28 NOTE — DISCHARGE NOTE OB - CARE PROVIDER_API CALL
Atilio Albarran  OBSTETRICS AND GYNECOLOGY  [address] [place] [address]  Phone: [phone]  Fax: [phone]  Established Patient  Follow Up Time: 1 week

## 2021-05-29 NOTE — PRE-ANESTHESIA EVALUATION ADULT - RESPIRATORY RATE (BREATHS/MIN)
May use sinus rinse (i.e. neti) with sterile water (bottled or tap water boiled x 10 minutes and cooled to room temperature) 2-3x daily for nasal congestion  May use flonase 1-2 sprays each nostril daily alone or after sinus rinse. Have head look down and direct spray upward and slightly lateral of midline. Discontinue if nosebleeds.   Alternatively, may use 4 sprays flonase in twice daily rinse or 6 sprays in once daily rinse.     Use antibiotic if sinusitis not improving after 10 days, improves and then worsens, or sinus pain lateralizes or develop focal pain with temp >101.       
18

## 2021-10-10 ENCOUNTER — TRANSCRIPTION ENCOUNTER (OUTPATIENT)
Age: 31
End: 2021-10-10

## 2021-10-24 ENCOUNTER — TRANSCRIPTION ENCOUNTER (OUTPATIENT)
Age: 31
End: 2021-10-24

## 2021-11-17 ENCOUNTER — TRANSCRIPTION ENCOUNTER (OUTPATIENT)
Age: 31
End: 2021-11-17

## 2022-05-15 ENCOUNTER — NON-APPOINTMENT (OUTPATIENT)
Age: 32
End: 2022-05-15

## 2022-09-14 PROBLEM — Z78.9 OTHER SPECIFIED HEALTH STATUS: Chronic | Status: ACTIVE | Noted: 2021-05-24

## 2022-09-15 ENCOUNTER — APPOINTMENT (OUTPATIENT)
Dept: OBGYN | Facility: CLINIC | Age: 32
End: 2022-09-15

## 2022-11-22 NOTE — PROCEDURAL SAFETY CHECKLIST WITH OR WITHOUT SEDATION - NSPTSPECIFCONCERNSD_GEN_ALL_CORE
no
no
Hydroxychloroquine Counseling:  I discussed with the patient that a baseline ophthalmologic exam is needed at the start of therapy and every year thereafter while on therapy. A CBC may also be warranted for monitoring.  The side effects of this medication were discussed with the patient, including but not limited to agranulocytosis, aplastic anemia, seizures, rashes, retinopathy, and liver toxicity. Patient instructed to call the office should any adverse effect occur.  The patient verbalized understanding of the proper use and possible adverse effects of Plaquenil.  All the patient's questions and concerns were addressed.

## 2023-08-02 NOTE — OB RN PATIENT PROFILE - CAREGIVER
Chest wall symmetrical, no chest wall tenderness, no retractions, no use of accessory muscles./normal
Declines

## 2023-11-08 ENCOUNTER — NON-APPOINTMENT (OUTPATIENT)
Age: 33
End: 2023-11-08

## 2023-11-17 ENCOUNTER — NON-APPOINTMENT (OUTPATIENT)
Age: 33
End: 2023-11-17

## 2023-12-16 ENCOUNTER — NON-APPOINTMENT (OUTPATIENT)
Age: 33
End: 2023-12-16

## 2024-01-30 NOTE — OB RN PATIENT PROFILE - BREAST MILK PROVIDES COLOSTRUM THAT IS HIGH IN PROTEIN
Message from Zeinab.   Patient to be discharged today.   Needs EGD/EUS FNA.     PRE-OP DATA and Check List - GI:  Procedure: EGD (86513), Endoscopic Ultrasound (EUS) (72576), and FNA and histology on site.   Diagnosis: pancreatic mass  Tentative Date: Emergent (within 1 week)  Tentative Time: To be determined  Duration of Procedure: 30 min  Anesthesia: MAC  Pre-Op Needed: no    Do take diltiazem the morning of the procedure.     Do not take hydralazine the AM of the procedure.     Do take metoprolol the morning of the procedure.     Do not take any herbal medications/ Vitamins/ Fish Oil for 7 days prior to your procedure unless otherwise directed by your doctor.    Patient should hold NSAID such as ibuprofen, aleve, advil, meloxicam, naproxen, aspirin, etc for 3 days prior to procedure. It is okay to use tylenol during this time.      
Statement Selected

## 2024-02-23 ENCOUNTER — OUTPATIENT (OUTPATIENT)
Dept: INPATIENT UNIT | Facility: HOSPITAL | Age: 34
LOS: 1 days | Discharge: HOME | End: 2024-02-23
Payer: COMMERCIAL

## 2024-02-23 VITALS
TEMPERATURE: 98 F | DIASTOLIC BLOOD PRESSURE: 65 MMHG | HEART RATE: 115 BPM | RESPIRATION RATE: 18 BRPM | SYSTOLIC BLOOD PRESSURE: 114 MMHG

## 2024-02-23 VITALS — DIASTOLIC BLOOD PRESSURE: 65 MMHG | HEART RATE: 115 BPM | SYSTOLIC BLOOD PRESSURE: 114 MMHG

## 2024-02-23 DIAGNOSIS — Z98.890 OTHER SPECIFIED POSTPROCEDURAL STATES: Chronic | ICD-10-CM

## 2024-02-23 DIAGNOSIS — O26.899 OTHER SPECIFIED PREGNANCY RELATED CONDITIONS, UNSPECIFIED TRIMESTER: ICD-10-CM

## 2024-02-23 LAB
APPEARANCE UR: CLEAR — SIGNIFICANT CHANGE UP
BILIRUB UR-MCNC: NEGATIVE — SIGNIFICANT CHANGE UP
COLOR SPEC: YELLOW — SIGNIFICANT CHANGE UP
DIFF PNL FLD: NEGATIVE — SIGNIFICANT CHANGE UP
GLUCOSE UR QL: NEGATIVE MG/DL — SIGNIFICANT CHANGE UP
KETONES UR-MCNC: NEGATIVE MG/DL — SIGNIFICANT CHANGE UP
LEUKOCYTE ESTERASE UR-ACNC: NEGATIVE — SIGNIFICANT CHANGE UP
NITRITE UR-MCNC: NEGATIVE — SIGNIFICANT CHANGE UP
PH UR: 6.5 — SIGNIFICANT CHANGE UP (ref 5–8)
PROT UR-MCNC: NEGATIVE MG/DL — SIGNIFICANT CHANGE UP
SP GR SPEC: 1.02 — SIGNIFICANT CHANGE UP (ref 1–1.03)
UROBILINOGEN FLD QL: 0.2 MG/DL — SIGNIFICANT CHANGE UP (ref 0.2–1)

## 2024-02-23 PROCEDURE — 87086 URINE CULTURE/COLONY COUNT: CPT

## 2024-02-23 PROCEDURE — 81003 URINALYSIS AUTO W/O SCOPE: CPT

## 2024-02-23 PROCEDURE — 99214 OFFICE O/P EST MOD 30 MIN: CPT

## 2024-02-23 NOTE — OB PROVIDER TRIAGE NOTE - NSOBPROVIDERNOTE_OBGYN_ALL_OB_FT
32yo  at 34w2d, GBS unknown, with right colicky abdominal pain now improving, possibly kidney stone, low suspicion for appendicitis, with reassuring maternal and fetal status  - Outpatient follow up with Dr Albarran as scheduled on Tuesday  - Strict infectious and  labor precautions given  - PO hydration, [facility] reviewed    D/w Dr Albarran

## 2024-02-23 NOTE — OB PROVIDER TRIAGE NOTE - ADDITIONAL INSTRUCTIONS
- If you experience regular/painful contractions, leakage of fluid, vaginal bleeding, or decreased/absent fetal movement then call your doctor or come to labor and delivery  - If you have any worsening pain, fevers/chills, nausea/vomiting/decreased appetite/changes in bowel habits then call your doctor or come to labor and delivery  - Maintain PO hydration  - Fetal kick counts  - Follow up with your OBGYN at your next scheduled visit.

## 2024-02-23 NOTE — OB PROVIDER TRIAGE NOTE - NS_OBGYNHISTORY_OBGYN_ALL_OB_FT
OB HX:   FT C/S x1 for IUGR failed IOL  SAB x1 at 10wk, medically treated  TOP x1 for T21 with D&E at 13-14wk    GYN HX: Denies ovarian cysts, STIs. Reports history of small fibroids and abnl pap smear with normal colposcopy.

## 2024-02-23 NOTE — OB PROVIDER TRIAGE NOTE - HISTORY OF PRESENT ILLNESS
32YO  at 34w2d CLAIRE 4/3/24 presents for right sided abdominal pain. Began around 2pm today, was 8/10 intensity, radiating from her RUQ to LLQ every 10 min. Was somewhat improved with ambulation. Did not take any pain medications. Reports the pain has gradually improved on its own, now 3/10 intensity. Denies contractions/cramping, leakage of fluid, vaginal bleeding or discharge. Feels good FM. Denies HA, CP, SOB, nausea, vomiting, fevers, chills, urinary symptoms, changes in bowel habits. Denies recent sick contacts or recent travel.     Last PO Intake: an hour ago, tolerated well  Last Bowel Movement: this morning, normal  Last Sexual [place]: 1wk ago

## 2024-02-23 NOTE — OB PROVIDER TRIAGE NOTE - NSHPPHYSICALEXAM_GEN_ALL_CORE
Physical exam:    Vital Signs Last 24 Hrs  T(F): 97.8 (23 Feb 2024 18:45), Max: 97.8 (23 Feb 2024 18:45)  HR: 115 (23 Feb 2024 18:47) (115 - 115)  BP: 114/65 (23 Feb 2024 18:47) (114/65 - 114/65)  RR: 18 (23 Feb 2024 18:45) (18 - 18)    Gen: AAOx3, NAD  CVA: Nontender to palpation bilaterally  Abdomen: Soft, nontender, no distension, gravid, no palpable contractions. Discomfort to deep palpation in the LLQ and RLQ.   Ext: No calf tenderness, no swelling    EFM: 145/mod tatyana/pos accel  toco: uterine irritability  SVE: 0/0/-3  BSS: cephalic, anterior placenta, MVP 4cm, BPP 8/8

## 2024-02-25 LAB
CULTURE RESULTS: SIGNIFICANT CHANGE UP
SPECIMEN SOURCE: SIGNIFICANT CHANGE UP

## 2024-02-26 ENCOUNTER — NON-APPOINTMENT (OUTPATIENT)
Age: 34
End: 2024-02-26

## 2024-02-26 DIAGNOSIS — R10.31 RIGHT LOWER QUADRANT PAIN: ICD-10-CM

## 2024-02-26 DIAGNOSIS — Z87.59 PERSONAL HISTORY OF OTHER COMPLICATIONS OF PREGNANCY, CHILDBIRTH AND THE PUERPERIUM: ICD-10-CM

## 2024-02-26 DIAGNOSIS — D21.9 BENIGN NEOPLASM OF CONNECTIVE AND OTHER SOFT TISSUE, UNSPECIFIED: ICD-10-CM

## 2024-02-26 DIAGNOSIS — O09.293 SUPERVISION OF PREGNANCY WITH OTHER POOR REPRODUCTIVE OR OBSTETRIC HISTORY, THIRD TRIMESTER: ICD-10-CM

## 2024-02-26 DIAGNOSIS — R10.32 LEFT LOWER QUADRANT PAIN: ICD-10-CM

## 2024-02-26 DIAGNOSIS — O26.893 OTHER SPECIFIED PREGNANCY RELATED CONDITIONS, THIRD TRIMESTER: ICD-10-CM

## 2024-02-26 DIAGNOSIS — O34.219 MATERNAL CARE FOR UNSPECIFIED TYPE SCAR FROM PREVIOUS CESAREAN DELIVERY: ICD-10-CM

## 2024-02-26 DIAGNOSIS — O99.891 OTHER SPECIFIED DISEASES AND CONDITIONS COMPLICATING PREGNANCY: ICD-10-CM

## 2024-02-26 DIAGNOSIS — Z3A.34 34 WEEKS GESTATION OF PREGNANCY: ICD-10-CM

## 2024-03-21 ENCOUNTER — INPATIENT (INPATIENT)
Facility: HOSPITAL | Age: 34
LOS: 1 days | Discharge: HOME | End: 2024-03-23
Attending: OBSTETRICS & GYNECOLOGY | Admitting: OBSTETRICS & GYNECOLOGY
Payer: COMMERCIAL

## 2024-03-21 ENCOUNTER — RESULT REVIEW (OUTPATIENT)
Age: 34
End: 2024-03-21

## 2024-03-21 VITALS
WEIGHT: 220.46 LBS | DIASTOLIC BLOOD PRESSURE: 67 MMHG | HEART RATE: 96 BPM | HEIGHT: 66 IN | SYSTOLIC BLOOD PRESSURE: 108 MMHG | TEMPERATURE: 98 F | RESPIRATION RATE: 20 BRPM

## 2024-03-21 DIAGNOSIS — Z98.890 OTHER SPECIFIED POSTPROCEDURAL STATES: Chronic | ICD-10-CM

## 2024-03-21 LAB
BASOPHILS # BLD AUTO: 0.01 K/UL — SIGNIFICANT CHANGE UP (ref 0–0.2)
BASOPHILS # BLD AUTO: 0.02 K/UL — SIGNIFICANT CHANGE UP (ref 0–0.2)
BASOPHILS NFR BLD AUTO: 0.1 % — SIGNIFICANT CHANGE UP (ref 0–1)
BASOPHILS NFR BLD AUTO: 0.1 % — SIGNIFICANT CHANGE UP (ref 0–1)
EOSINOPHIL # BLD AUTO: 0 K/UL — SIGNIFICANT CHANGE UP (ref 0–0.7)
EOSINOPHIL # BLD AUTO: 0.02 K/UL — SIGNIFICANT CHANGE UP (ref 0–0.7)
EOSINOPHIL NFR BLD AUTO: 0 % — SIGNIFICANT CHANGE UP (ref 0–8)
EOSINOPHIL NFR BLD AUTO: 0.3 % — SIGNIFICANT CHANGE UP (ref 0–8)
HCT VFR BLD CALC: 35.7 % — LOW (ref 37–47)
HCT VFR BLD CALC: 35.8 % — LOW (ref 37–47)
HGB BLD-MCNC: 11.9 G/DL — LOW (ref 12–16)
HGB BLD-MCNC: 12.2 G/DL — SIGNIFICANT CHANGE UP (ref 12–16)
HIV 1 & 2 AB SERPL IA.RAPID: SIGNIFICANT CHANGE UP
IMM GRANULOCYTES NFR BLD AUTO: 0.4 % — HIGH (ref 0.1–0.3)
IMM GRANULOCYTES NFR BLD AUTO: 0.6 % — HIGH (ref 0.1–0.3)
LYMPHOCYTES # BLD AUTO: 0.75 K/UL — LOW (ref 1.2–3.4)
LYMPHOCYTES # BLD AUTO: 1.47 K/UL — SIGNIFICANT CHANGE UP (ref 1.2–3.4)
LYMPHOCYTES # BLD AUTO: 19.8 % — LOW (ref 20.5–51.1)
LYMPHOCYTES # BLD AUTO: 4.7 % — LOW (ref 20.5–51.1)
MCHC RBC-ENTMCNC: 30.7 PG — SIGNIFICANT CHANGE UP (ref 27–31)
MCHC RBC-ENTMCNC: 30.7 PG — SIGNIFICANT CHANGE UP (ref 27–31)
MCHC RBC-ENTMCNC: 33.2 G/DL — SIGNIFICANT CHANGE UP (ref 32–37)
MCHC RBC-ENTMCNC: 34.2 G/DL — SIGNIFICANT CHANGE UP (ref 32–37)
MCV RBC AUTO: 89.9 FL — SIGNIFICANT CHANGE UP (ref 81–99)
MCV RBC AUTO: 92.3 FL — SIGNIFICANT CHANGE UP (ref 81–99)
MONOCYTES # BLD AUTO: 0.5 K/UL — SIGNIFICANT CHANGE UP (ref 0.1–0.6)
MONOCYTES # BLD AUTO: 0.61 K/UL — HIGH (ref 0.1–0.6)
MONOCYTES NFR BLD AUTO: 3.8 % — SIGNIFICANT CHANGE UP (ref 1.7–9.3)
MONOCYTES NFR BLD AUTO: 6.7 % — SIGNIFICANT CHANGE UP (ref 1.7–9.3)
NEUTROPHILS # BLD AUTO: 14.42 K/UL — HIGH (ref 1.4–6.5)
NEUTROPHILS # BLD AUTO: 5.4 K/UL — SIGNIFICANT CHANGE UP (ref 1.4–6.5)
NEUTROPHILS NFR BLD AUTO: 72.7 % — SIGNIFICANT CHANGE UP (ref 42.2–75.2)
NEUTROPHILS NFR BLD AUTO: 90.8 % — HIGH (ref 42.2–75.2)
NRBC # BLD: 0 /100 WBCS — SIGNIFICANT CHANGE UP (ref 0–0)
NRBC # BLD: 0 /100 WBCS — SIGNIFICANT CHANGE UP (ref 0–0)
PLATELET # BLD AUTO: 177 K/UL — SIGNIFICANT CHANGE UP (ref 130–400)
PLATELET # BLD AUTO: 185 K/UL — SIGNIFICANT CHANGE UP (ref 130–400)
PMV BLD: 10.7 FL — HIGH (ref 7.4–10.4)
PMV BLD: 11.2 FL — HIGH (ref 7.4–10.4)
PRENATAL SYPHILIS TEST: SIGNIFICANT CHANGE UP
RBC # BLD: 3.88 M/UL — LOW (ref 4.2–5.4)
RBC # BLD: 3.97 M/UL — LOW (ref 4.2–5.4)
RBC # FLD: 13.4 % — SIGNIFICANT CHANGE UP (ref 11.5–14.5)
RBC # FLD: 13.5 % — SIGNIFICANT CHANGE UP (ref 11.5–14.5)
WBC # BLD: 15.89 K/UL — HIGH (ref 4.8–10.8)
WBC # BLD: 7.43 K/UL — SIGNIFICANT CHANGE UP (ref 4.8–10.8)
WBC # FLD AUTO: 15.89 K/UL — HIGH (ref 4.8–10.8)
WBC # FLD AUTO: 7.43 K/UL — SIGNIFICANT CHANGE UP (ref 4.8–10.8)

## 2024-03-21 PROCEDURE — 85025 COMPLETE CBC W/AUTO DIFF WBC: CPT

## 2024-03-21 PROCEDURE — 86703 HIV-1/HIV-2 1 RESULT ANTBDY: CPT

## 2024-03-21 PROCEDURE — 86901 BLOOD TYPING SEROLOGIC RH(D): CPT

## 2024-03-21 PROCEDURE — 88307 TISSUE EXAM BY PATHOLOGIST: CPT

## 2024-03-21 PROCEDURE — 86592 SYPHILIS TEST NON-TREP QUAL: CPT

## 2024-03-21 PROCEDURE — 59025 FETAL NON-STRESS TEST: CPT

## 2024-03-21 PROCEDURE — 36415 COLL VENOUS BLD VENIPUNCTURE: CPT

## 2024-03-21 PROCEDURE — 86850 RBC ANTIBODY SCREEN: CPT

## 2024-03-21 PROCEDURE — 88307 TISSUE EXAM BY PATHOLOGIST: CPT | Mod: 26

## 2024-03-21 PROCEDURE — 86900 BLOOD TYPING SEROLOGIC ABO: CPT

## 2024-03-21 RX ORDER — ACETAMINOPHEN 500 MG
975 TABLET ORAL
Refills: 0 | Status: DISCONTINUED | OUTPATIENT
Start: 2024-03-21 | End: 2024-03-23

## 2024-03-21 RX ORDER — MAGNESIUM HYDROXIDE 400 MG/1
30 TABLET, CHEWABLE ORAL
Refills: 0 | Status: DISCONTINUED | OUTPATIENT
Start: 2024-03-21 | End: 2024-03-23

## 2024-03-21 RX ORDER — SODIUM CHLORIDE 9 MG/ML
1000 INJECTION, SOLUTION INTRAVENOUS
Refills: 0 | Status: DISCONTINUED | OUTPATIENT
Start: 2024-03-21 | End: 2024-03-23

## 2024-03-21 RX ORDER — DEXAMETHASONE 0.5 MG/5ML
4 ELIXIR ORAL EVERY 6 HOURS
Refills: 0 | Status: DISCONTINUED | OUTPATIENT
Start: 2024-03-21 | End: 2024-03-21

## 2024-03-21 RX ORDER — SENNA PLUS 8.6 MG/1
2 TABLET ORAL AT BEDTIME
Refills: 0 | Status: DISCONTINUED | OUTPATIENT
Start: 2024-03-21 | End: 2024-03-21

## 2024-03-21 RX ORDER — SODIUM CHLORIDE 9 MG/ML
1000 INJECTION, SOLUTION INTRAVENOUS ONCE
Refills: 0 | Status: COMPLETED | OUTPATIENT
Start: 2024-03-21 | End: 2024-03-21

## 2024-03-21 RX ORDER — IBUPROFEN 200 MG
600 TABLET ORAL EVERY 6 HOURS
Refills: 0 | Status: COMPLETED | OUTPATIENT
Start: 2024-03-21 | End: 2025-02-17

## 2024-03-21 RX ORDER — LANOLIN
1 OINTMENT (GRAM) TOPICAL EVERY 6 HOURS
Refills: 0 | Status: DISCONTINUED | OUTPATIENT
Start: 2024-03-21 | End: 2024-03-23

## 2024-03-21 RX ORDER — OXYTOCIN 10 UNIT/ML
333.33 VIAL (ML) INJECTION
Qty: 20 | Refills: 0 | Status: DISCONTINUED | OUTPATIENT
Start: 2024-03-21 | End: 2024-03-23

## 2024-03-21 RX ORDER — KETOROLAC TROMETHAMINE 30 MG/ML
30 SYRINGE (ML) INJECTION EVERY 6 HOURS
Refills: 0 | Status: DISCONTINUED | OUTPATIENT
Start: 2024-03-21 | End: 2024-03-22

## 2024-03-21 RX ORDER — FAMOTIDINE 10 MG/ML
20 INJECTION INTRAVENOUS ONCE
Refills: 0 | Status: DISCONTINUED | OUTPATIENT
Start: 2024-03-21 | End: 2024-03-21

## 2024-03-21 RX ORDER — ONDANSETRON 8 MG/1
4 TABLET, FILM COATED ORAL EVERY 6 HOURS
Refills: 0 | Status: DISCONTINUED | OUTPATIENT
Start: 2024-03-21 | End: 2024-03-21

## 2024-03-21 RX ORDER — NALOXONE HYDROCHLORIDE 4 MG/.1ML
0.1 SPRAY NASAL
Refills: 0 | Status: DISCONTINUED | OUTPATIENT
Start: 2024-03-21 | End: 2024-03-21

## 2024-03-21 RX ORDER — DIPHENHYDRAMINE HCL 50 MG
25 CAPSULE ORAL EVERY 6 HOURS
Refills: 0 | Status: DISCONTINUED | OUTPATIENT
Start: 2024-03-21 | End: 2024-03-23

## 2024-03-21 RX ORDER — OXYCODONE HYDROCHLORIDE 5 MG/1
5 TABLET ORAL
Refills: 0 | Status: DISCONTINUED | OUTPATIENT
Start: 2024-03-21 | End: 2024-03-23

## 2024-03-21 RX ORDER — TETANUS TOXOID, REDUCED DIPHTHERIA TOXOID AND ACELLULAR PERTUSSIS VACCINE, ADSORBED 5; 2.5; 8; 8; 2.5 [IU]/.5ML; [IU]/.5ML; UG/.5ML; UG/.5ML; UG/.5ML
0.5 SUSPENSION INTRAMUSCULAR ONCE
Refills: 0 | Status: DISCONTINUED | OUTPATIENT
Start: 2024-03-21 | End: 2024-03-23

## 2024-03-21 RX ORDER — CEFAZOLIN SODIUM 1 G
2000 VIAL (EA) INJECTION ONCE
Refills: 0 | Status: COMPLETED | OUTPATIENT
Start: 2024-03-21 | End: 2024-03-21

## 2024-03-21 RX ORDER — SIMETHICONE 80 MG/1
80 TABLET, CHEWABLE ORAL EVERY 4 HOURS
Refills: 0 | Status: DISCONTINUED | OUTPATIENT
Start: 2024-03-21 | End: 2024-03-23

## 2024-03-21 RX ORDER — SODIUM CHLORIDE 9 MG/ML
1000 INJECTION, SOLUTION INTRAVENOUS
Refills: 0 | Status: DISCONTINUED | OUTPATIENT
Start: 2024-03-21 | End: 2024-03-21

## 2024-03-21 RX ORDER — MORPHINE SULFATE 50 MG/1
0.15 CAPSULE, EXTENDED RELEASE ORAL ONCE
Refills: 0 | Status: DISCONTINUED | OUTPATIENT
Start: 2024-03-21 | End: 2024-03-21

## 2024-03-21 RX ORDER — OXYCODONE HYDROCHLORIDE 5 MG/1
5 TABLET ORAL ONCE
Refills: 0 | Status: DISCONTINUED | OUTPATIENT
Start: 2024-03-21 | End: 2024-03-23

## 2024-03-21 RX ORDER — ONDANSETRON 8 MG/1
4 TABLET, FILM COATED ORAL ONCE
Refills: 0 | Status: COMPLETED | OUTPATIENT
Start: 2024-03-21 | End: 2024-03-21

## 2024-03-21 RX ORDER — OXYTOCIN 10 UNIT/ML
333.33 VIAL (ML) INJECTION
Qty: 20 | Refills: 0 | Status: DISCONTINUED | OUTPATIENT
Start: 2024-03-21 | End: 2024-03-21

## 2024-03-21 RX ADMIN — Medication 975 MILLIGRAM(S): at 21:50

## 2024-03-21 RX ADMIN — ONDANSETRON 4 MILLIGRAM(S): 8 TABLET, FILM COATED ORAL at 20:49

## 2024-03-21 RX ADMIN — SODIUM CHLORIDE 2000 MILLILITER(S): 9 INJECTION, SOLUTION INTRAVENOUS at 13:29

## 2024-03-21 RX ADMIN — SIMETHICONE 80 MILLIGRAM(S): 80 TABLET, CHEWABLE ORAL at 23:12

## 2024-03-21 RX ADMIN — ONDANSETRON 4 MILLIGRAM(S): 8 TABLET, FILM COATED ORAL at 16:35

## 2024-03-21 RX ADMIN — Medication 100 MILLIGRAM(S): at 13:31

## 2024-03-21 RX ADMIN — Medication 30 MILLIGRAM(S): at 20:49

## 2024-03-21 RX ADMIN — Medication 30 MILLIGRAM(S): at 21:49

## 2024-03-21 RX ADMIN — Medication 975 MILLIGRAM(S): at 20:50

## 2024-03-21 NOTE — OB PROVIDER H&P - NSHPLABSRESULTS_GEN_ALL_CORE
no prenatal chart        9/7/23  O pos, antibody neg  HIV nr  HbsAg nr  syph nr  Hep C nr  MMRV immune  NIPT low risk   nml panorama  AFP neg

## 2024-03-21 NOTE — CHART NOTE - NSCHARTNOTEFT_GEN_A_CORE
PACU ANESTHESIA ADMISSION NOTE      Procedure: Repeat C/Section  Post op diagnosis:      ____  Intubated  TV:______       Rate: ______      FiO2: ______    _x___  Patent Airway    _x___  Full return of protective reflexes    ____  Full recovery from anesthesia / back to baseline status    Vitals:            T: 97.4               BP :    113/64            R:    16          Sat:   100            P:  73      Mental Status:  _x___ Awake   _____ Alert   _____ Drowsy   _____ Sedated    Nausea/Vomiting:  _x___  NO       ______Yes,   See Post - Op Orders         Pain Scale (0-10):  __0___    Treatment: ___ None    __x__ See Post - Op/PCA Orders    Post - Operative Fluids:   ___ Oral   ___x_ See Post - Op Orders    Plan: Discharge:   _Home       ___x__Floor     _____Critical Care    _____  Other:_________________    Comments:  No anesthesia issues or complications noted.  Discharge when criteria met.

## 2024-03-21 NOTE — OB PROVIDER H&P - ASSESSMENT
34yo , @38w1d, IUGR AC <10%, GBS neg, for repeat c/s #2.     -admit to labor and delivery  -pain management prn   -continous efm & toco  -admission labs  -IV access   -IV hydration   -diet: npo   - abx: ancef     D/w Dr. Albarran.

## 2024-03-21 NOTE — BRIEF OPERATIVE NOTE - NSICDXBRIEFPREOP_GEN_ALL_CORE_FT
PRE-OP DIAGNOSIS:  38 weeks gestation of pregnancy 21-Mar-2024 15:56:32  Lilliam Sagastume  History of  delivery 21-Mar-2024 15:56:59  Lilliam Sagastume  Fetal growth restriction antepartum 21-Mar-2024 15:57:19  Lilliam Sagastume

## 2024-03-21 NOTE — OB RN INTRAOPERATIVE NOTE - NSSELHIDDEN_OBGYN_ALL_OB_FT
[NS_DeliveryAttending1_OBGYN_ALL_OB_FT:[id number]],[NS_DeliveryAssist1_OBGYN_ALL_OB_FT:[id number]],[NS_DeliveryRN_OBGYN_ALL_OB_FT:[id number]=]

## 2024-03-21 NOTE — BRIEF OPERATIVE NOTE - NSICDXBRIEFPOSTOP_GEN_ALL_CORE_FT
POST-OP DIAGNOSIS:  38 weeks gestation of pregnancy 21-Mar-2024 16:00:40  Lilliam Sagastume  History of  section 21-Mar-2024 16:00:52  Lilliam Sagastume  Fetal growth restriction antepartum 21-Mar-2024 16:01:03  Lilliam Sagastume

## 2024-03-21 NOTE — OB PROVIDER H&P - HISTORY OF PRESENT ILLNESS
32yo , @38w1d, CLAIRE 4/3/24 by LMP, here for repeat c/s #2.  Per patient, at last growth scan baby was measuring IUGR with EFW 12% AC<10%.  Denies, contractions, lof, vb. + FM. Denies complications this pregnancy. GBS neg.

## 2024-03-21 NOTE — OB RN PATIENT PROFILE - NS_GESTAGE_OBGYN_ALL_OB_FT
Problem: Patient Care Overview (Infant)  Goal: Plan of Care Review  Outcome: Ongoing (interventions implemented as appropriate)    17 1352 17 0002 17 0547   Coping/Psychosocial Response   Care Plan Reviewed With --  mother --    Patient Care Overview   Progress improving --  --    Outcome Evaluation   Outcome Summary/Follow up Plan --  --  discharge on        Goal: Infant Individualization and Mutuality  Outcome: Ongoing (interventions implemented as appropriate)    17 [id number]   Individualization   Patient Specific Preferences Breastfeeding --    Patient Specific Goals --  increase breastfeeding frequency    Patient Specific Interventions --  cue based feeding   Mutuality/Individual Preferences   Questions/Concerns about Infant --  answered by staff    Other Necessary Information to Provide Care for Infant/Parents/Family 17 yr. old Mom, Grandmother helping with baby. --        Goal: Discharge Needs Assessment  Outcome: Ongoing (interventions implemented as appropriate)    17 1702 17 0417 17 1352   Discharge Needs Assessment   Concerns To Be Addressed --  --  no discharge needs identified   Readmission Within The Last 30 Days --  --  no previous admission in last 30 days   Equipment Needed After Discharge --  none --    Discharge Disposition --  home or self-care --    Discharge Planning Comments to be D/Elise[place] home with Mom and Grandmother in good condition. --  --    Current Health   Anticipated Changes Related to Illness --  none --    Self-Care   Equipment Currently Used at Home --  none --    Living Environment   Transportation Available --  car;family or friend will provide --          Problem:  ([place],NICU)  Goal: Signs and Symptoms of Listed Potential Problems Will be Absent or Manageable ([place])  Outcome: Ongoing (interventions implemented as appropriate)    17 0547   [place]   Problems Assessed ([place]) all   Problems Present  () none            38w1d

## 2024-03-21 NOTE — OB RN INTRAOPERATIVE NOTE - NS_IMPLANTS_ARISTA LOT NUMBER_OBGYN_ALL_OB_FT
Musa Blel called in again today. She said she has not been able to get out of bed because her muscles are so tight. She would like to know if she should restart the Gabapentin. [phone]

## 2024-03-21 NOTE — BRIEF OPERATIVE NOTE - OPERATION/FINDINGS
Viable female infant delivered in vertex presentation. Fibroid uterus with approx 8cm anterior left subserosal fibroid. Normal appearing bilateral ovaries and bilateral fallopian tubes.

## 2024-03-21 NOTE — OB RN PATIENT PROFILE - FALL HARM RISK - UNIVERSAL INTERVENTIONS
Bed in lowest position, wheels locked, appropriate side rails in place/Call bell, personal items and telephone in reach/Instruct patient to call for assistance before getting out of bed or chair/Non-slip footwear when patient is out of bed/[place] to call system/Physically safe environment - no spills, clutter or unnecessary equipment/Purposeful Proactive Rounding/Room/bathroom lighting operational, light cord in reach

## 2024-03-21 NOTE — OB PROVIDER H&P - NSHPPHYSICALEXAM_GEN_ALL_CORE
Increase furosemide (lasix) to 40 mg twice daily (2 tablets twice a day), until current prescription runs out and then  new prescription  Increase Entresto to twice daily until the current prescription runs out and then  the new prescription  Follow up with me in 2 weeks  
Vital Signs Last 24 Hrs  T(C): 36.7 (21 Mar 2024 09:31), Max: 36.7 (21 Mar 2024 09:04)  T(F): 98 (21 Mar 2024 09:04), Max: 98 (21 Mar 2024 09:04)  HR: 96 (21 Mar 2024 09:31) (96 - 96)  BP: 108/67 (21 Mar 2024 09:31) (108/67 - 108/67)  RR: 20 (21 Mar 2024 09:31) (20 - 20)    Gen: AOx3, no acute distress  Abd: soft, gravid, non tender, no palpable contractions  Ext: No edema bilaterally     EFM:  140/mod/+accels; cat 1  [place]: no contractions  SVE: deferred

## 2024-03-21 NOTE — OB PROVIDER H&P - PRO HIV INFANT
Tg Ford is a 77 y.o.  female and presents with    Chief Complaint   Patient presents with    Medication Evaluation    Hypertension       Subjective:  Cardiovascular Review:  The patient has hypertension. Diet and Lifestyle: generally follows a low sodium diet  Home BP Monitoring: is borderline controlled at home, ranging 150's   Pertinent ROS: taking medications as instructed, no medication side effects noted, no TIA's, no chest pain on exertion, no dyspnea on exertion, no swelling of ankles. She does admit to taking 2 tabs of amlodipine within 24 hours because she has seen some readings of high blood pressure. She continues to smoke despite taking Wellbutrin. She reports fatigue. She goes to bed around 9pm and wakes up around 3 am and is unable to go back to sleep. This is a daily occurrence. She takes OTC vitamin B12 for energy. Additional Concerns: No         Patient Active Problem List   Diagnosis Code    Essential hypertension I10    Hyperlipidemia LDL goal <100 E78.5    Conductive hearing loss of both ears H90.0    Cigarette smoker F17.210     Current Outpatient Prescriptions   Medication Sig Dispense Refill    amLODIPine (NORVASC) 10 mg tablet Take 1 Tab by mouth daily. 90 Tab 0    alendronate (FOSAMAX) 70 mg tablet Take 1 Tab by mouth every seven (7) days. Indications: POST-MENOPAUSAL OSTEOPOROSIS 12 Tab 0    buPROPion SR (WELLBUTRIN SR) 150 mg SR tablet Take 1 Tab by mouth two (2) times a day. 60 Tab 0    simvastatin (ZOCOR) 20 mg tablet Take 1 Tab by mouth nightly. 90 Tab 0    acetaminophen (TYLENOL) 325 mg tablet Take 2 Tabs by mouth every four (4) hours as needed for Pain.  20 Tab 0     Allergies   Allergen Reactions    Augmentin [Amoxicillin-Pot Clavulanate] Hives    Codeine Nausea and Vomiting     Past Medical History:   Diagnosis Date    Hypercholesterolemia     Hypertension      Past Surgical History:   Procedure Laterality Date    HX APPENDECTOMY childhood    HX ORTHOPAEDIC      left hip repair     Family History   Problem Relation Age of Onset    Cancer Mother      ovarian CA    Elevated Lipids Father     Heart Disease Father     Hypertension Father     Diabetes Brother      Social History   Substance Use Topics    Smoking status: Current Every Day Smoker     Packs/day: 0.25     Years: 20.00    Smokeless tobacco: Never Used    Alcohol use No       ROS   History obtained from the patient  General ROS: negative for - chills or fever, positive for fatigue  Respiratory ROS: no cough, shortness of breath, or wheezing  Cardiovascular ROS: no chest pain or dyspnea on exertion    All other systems reviewed and are negative.       Objective:  Vitals:    10/31/17 1010   BP: 135/71   Pulse: 79   Resp: 18   Temp: 97.6 °F (36.4 °C)   TempSrc: Oral   SpO2: 97%   Weight: 103 lb 9.6 oz (47 kg)   Height: 5' 4\" (1.626 m)   PainSc:   0 - No pain       PE  General appearance - alert, well appearing, and in no distress  Mental status - alert, oriented to person, place, and time  Chest - clear to auscultation, no wheezes, rales or rhonchi, symmetric air entry  Heart - normal rate and regular rhythm  Extremities - peripheral pulses normal, no pedal edema, no clubbing or cyanosis    LABS   Lab Results  Component Value Date/Time   WBC 6.7 10/17/2017 11:15 AM   HGB 14.7 10/17/2017 11:15 AM   HCT 43.0 10/17/2017 11:15 AM   PLATELET 806 24/98/3133 11:15 AM   MCV 95.6 10/17/2017 11:15 AM     Lab Results  Component Value Date/Time   Cholesterol, total 225 10/17/2017 11:15 AM   HDL Cholesterol 61 10/17/2017 11:15 AM   LDL, calculated 139.4 10/17/2017 11:15 AM   Triglyceride 123 10/17/2017 11:15 AM   CHOL/HDL Ratio 3.7 10/17/2017 11:15 AM     Lab Results   Component Value Date/Time    Sodium 140 10/17/2017 11:15 AM    Potassium 4.6 10/17/2017 11:15 AM    Chloride 106 10/17/2017 11:15 AM    CO2 29 10/17/2017 11:15 AM    Anion gap 5 10/17/2017 11:15 AM    Glucose 81 10/17/2017 11:15 AM    BUN 17 10/17/2017 11:15 AM    Creatinine 0.86 10/17/2017 11:15 AM    BUN/Creatinine ratio 20 10/17/2017 11:15 AM    GFR est AA >60 10/17/2017 11:15 AM    GFR est non-AA >60 10/17/2017 11:15 AM    Calcium 9.1 10/17/2017 11:15 AM    Bilirubin, total 0.3 10/17/2017 11:15 AM    ALT (SGPT) 18 10/17/2017 11:15 AM    AST (SGOT) 17 10/17/2017 11:15 AM    Alk. phosphatase 90 10/17/2017 11:15 AM    Protein, total 7.3 10/17/2017 11:15 AM    Albumin 4.4 10/17/2017 11:15 AM    Globulin 2.9 10/17/2017 11:15 AM    A-G Ratio 1.5 10/17/2017 11:15 AM        10/18/2017  2:41 PM - [place], Lab In [facility]   Component Results   Component Value Flag Ref Range Units Status   Calcitriol (Vit D 1, 25 di-OH) 46.2  19.9 - 79.3 pg/mL Final           Assessment/Plan:    1. Hypertension- well controlled; advised to take meds as directed (do not take extra doses of amlodipine); keep log for the next week and call office if BP elevated; no more leg cramps since discontinuing losartan    2. Fatigue- chronic; discussed sleep hygiene; encouraged daily multivitamin; will continue to monitor    Lab review: labs reviewed, I note that lipids LDL result does not yet meet goal    Today's Visit: Continue current regimen    Health Maintenance:   Colon Cancer Screening- FIT test ordered    I have discussed the diagnosis with the patient and the intended plan as seen in the above orders. The patient has received an after-visit summary and questions were answered concerning future plans. I have discussed medication side effects and warnings with the patient as well. I have reviewed the plan of care with the patient, accepted their input and they are in agreement with the treatment goals. Follow-up Disposition:  Return in about 10 weeks (around 1/9/2018) for follow up blood pressure. More than 1/2 of this 15 minute visit was spent in counseling and coordination of care, as described above.     Sohail Benson, GEMINIP-C negative

## 2024-03-21 NOTE — OB PROVIDER DELIVERY SUMMARY - NSSELHIDDEN_OBGYN_ALL_OB_FT
[NS_DeliveryAttending1_OBGYN_ALL_OB_FT:[id number]],[NS_DeliveryAssist1_OBGYN_ALL_OB_FT:[id number]],[NS_DeliveryRN_OBGYN_ALL_OB_FT:[id number]]

## 2024-03-22 LAB
BASOPHILS # BLD AUTO: 0.02 K/UL — SIGNIFICANT CHANGE UP (ref 0–0.2)
BASOPHILS NFR BLD AUTO: 0.2 % — SIGNIFICANT CHANGE UP (ref 0–1)
EOSINOPHIL # BLD AUTO: 0.03 K/UL — SIGNIFICANT CHANGE UP (ref 0–0.7)
EOSINOPHIL NFR BLD AUTO: 0.3 % — SIGNIFICANT CHANGE UP (ref 0–8)
HCT VFR BLD CALC: 32.2 % — LOW (ref 37–47)
HGB BLD-MCNC: 10.6 G/DL — LOW (ref 12–16)
IMM GRANULOCYTES NFR BLD AUTO: 0.5 % — HIGH (ref 0.1–0.3)
LYMPHOCYTES # BLD AUTO: 1.94 K/UL — SIGNIFICANT CHANGE UP (ref 1.2–3.4)
LYMPHOCYTES # BLD AUTO: 16.6 % — LOW (ref 20.5–51.1)
MCHC RBC-ENTMCNC: 30.9 PG — SIGNIFICANT CHANGE UP (ref 27–31)
MCHC RBC-ENTMCNC: 32.9 G/DL — SIGNIFICANT CHANGE UP (ref 32–37)
MCV RBC AUTO: 93.9 FL — SIGNIFICANT CHANGE UP (ref 81–99)
MONOCYTES # BLD AUTO: 0.84 K/UL — HIGH (ref 0.1–0.6)
MONOCYTES NFR BLD AUTO: 7.2 % — SIGNIFICANT CHANGE UP (ref 1.7–9.3)
NEUTROPHILS # BLD AUTO: 8.83 K/UL — HIGH (ref 1.4–6.5)
NEUTROPHILS NFR BLD AUTO: 75.2 % — SIGNIFICANT CHANGE UP (ref 42.2–75.2)
NRBC # BLD: 0 /100 WBCS — SIGNIFICANT CHANGE UP (ref 0–0)
PLATELET # BLD AUTO: 165 K/UL — SIGNIFICANT CHANGE UP (ref 130–400)
PMV BLD: 11 FL — HIGH (ref 7.4–10.4)
RBC # BLD: 3.43 M/UL — LOW (ref 4.2–5.4)
RBC # FLD: 13.7 % — SIGNIFICANT CHANGE UP (ref 11.5–14.5)
WBC # BLD: 11.72 K/UL — HIGH (ref 4.8–10.8)
WBC # FLD AUTO: 11.72 K/UL — HIGH (ref 4.8–10.8)

## 2024-03-22 RX ORDER — IBUPROFEN 200 MG
600 TABLET ORAL EVERY 6 HOURS
Refills: 0 | Status: DISCONTINUED | OUTPATIENT
Start: 2024-03-22 | End: 2024-03-23

## 2024-03-22 RX ORDER — ENOXAPARIN SODIUM 100 MG/ML
40 INJECTION SUBCUTANEOUS EVERY 24 HOURS
Refills: 0 | Status: DISCONTINUED | OUTPATIENT
Start: 2024-03-22 | End: 2024-03-23

## 2024-03-22 RX ADMIN — Medication 30 MILLIGRAM(S): at 13:31

## 2024-03-22 RX ADMIN — Medication 30 MILLIGRAM(S): at 04:54

## 2024-03-22 RX ADMIN — Medication 975 MILLIGRAM(S): at 04:56

## 2024-03-22 RX ADMIN — Medication 975 MILLIGRAM(S): at 09:02

## 2024-03-22 RX ADMIN — Medication 30 MILLIGRAM(S): at 12:25

## 2024-03-22 RX ADMIN — Medication 30 MILLIGRAM(S): at 17:08

## 2024-03-22 RX ADMIN — ENOXAPARIN SODIUM 40 MILLIGRAM(S): 100 INJECTION SUBCUTANEOUS at 17:09

## 2024-03-22 RX ADMIN — SIMETHICONE 80 MILLIGRAM(S): 80 TABLET, CHEWABLE ORAL at 23:09

## 2024-03-22 RX ADMIN — SIMETHICONE 80 MILLIGRAM(S): 80 TABLET, CHEWABLE ORAL at 09:25

## 2024-03-22 RX ADMIN — Medication 600 MILLIGRAM(S): at 23:10

## 2024-03-22 RX ADMIN — Medication 30 MILLIGRAM(S): at 03:54

## 2024-03-22 RX ADMIN — SIMETHICONE 80 MILLIGRAM(S): 80 TABLET, CHEWABLE ORAL at 09:02

## 2024-03-22 RX ADMIN — Medication 30 MILLIGRAM(S): at 18:08

## 2024-03-22 RX ADMIN — SIMETHICONE 80 MILLIGRAM(S): 80 TABLET, CHEWABLE ORAL at 03:55

## 2024-03-22 RX ADMIN — Medication 975 MILLIGRAM(S): at 15:12

## 2024-03-22 RX ADMIN — SIMETHICONE 80 MILLIGRAM(S): 80 TABLET, CHEWABLE ORAL at 14:12

## 2024-03-22 RX ADMIN — Medication 975 MILLIGRAM(S): at 10:03

## 2024-03-22 RX ADMIN — SIMETHICONE 80 MILLIGRAM(S): 80 TABLET, CHEWABLE ORAL at 17:08

## 2024-03-22 RX ADMIN — Medication 975 MILLIGRAM(S): at 03:56

## 2024-03-22 RX ADMIN — Medication 975 MILLIGRAM(S): at 14:12

## 2024-03-23 ENCOUNTER — TRANSCRIPTION ENCOUNTER (OUTPATIENT)
Age: 34
End: 2024-03-23

## 2024-03-23 VITALS
TEMPERATURE: 98 F | RESPIRATION RATE: 18 BRPM | DIASTOLIC BLOOD PRESSURE: 65 MMHG | SYSTOLIC BLOOD PRESSURE: 117 MMHG | HEART RATE: 94 BPM

## 2024-03-23 RX ORDER — IBUPROFEN 200 MG
1 TABLET ORAL
Qty: 30 | Refills: 0
Start: 2024-03-23

## 2024-03-23 RX ORDER — ACETAMINOPHEN 500 MG
3 TABLET ORAL
Qty: 30 | Refills: 0
Start: 2024-03-23

## 2024-03-23 RX ORDER — SIMETHICONE 80 MG/1
1 TABLET, CHEWABLE ORAL
Qty: 30 | Refills: 0
Start: 2024-03-23

## 2024-03-23 RX ORDER — GABAPENTIN 400 MG/1
100 CAPSULE ORAL THREE TIMES A DAY
Refills: 0 | Status: DISCONTINUED | OUTPATIENT
Start: 2024-03-23 | End: 2024-03-23

## 2024-03-23 RX ADMIN — Medication 600 MILLIGRAM(S): at 12:17

## 2024-03-23 RX ADMIN — SIMETHICONE 80 MILLIGRAM(S): 80 TABLET, CHEWABLE ORAL at 09:12

## 2024-03-23 RX ADMIN — Medication 600 MILLIGRAM(S): at 06:04

## 2024-03-23 RX ADMIN — Medication 975 MILLIGRAM(S): at 09:12

## 2024-03-23 RX ADMIN — SIMETHICONE 80 MILLIGRAM(S): 80 TABLET, CHEWABLE ORAL at 02:01

## 2024-03-23 RX ADMIN — Medication 975 MILLIGRAM(S): at 09:45

## 2024-03-23 RX ADMIN — SIMETHICONE 80 MILLIGRAM(S): 80 TABLET, CHEWABLE ORAL at 06:04

## 2024-03-23 RX ADMIN — MAGNESIUM HYDROXIDE 30 MILLILITER(S): 400 TABLET, CHEWABLE ORAL at 03:16

## 2024-03-23 RX ADMIN — Medication 975 MILLIGRAM(S): at 03:00

## 2024-03-23 RX ADMIN — Medication 600 MILLIGRAM(S): at 13:00

## 2024-03-23 RX ADMIN — Medication 600 MILLIGRAM(S): at 00:10

## 2024-03-23 RX ADMIN — Medication 975 MILLIGRAM(S): at 02:01

## 2024-03-23 NOTE — DISCHARGE NOTE OB - MEDICATION SUMMARY - MEDICATIONS TO TAKE
I will START or STAY ON the medications listed below when I get home from the hospital:    ibuprofen 600 mg oral tablet  -- 1 tab(s) by mouth every 6 hours as needed for -  -- Indication: For pain    acetaminophen 325 mg oral tablet  -- 3 tab(s) by mouth every 6 hours  -- Indication: For pain    simethicone 80 mg oral tablet, chewable  -- 1 tab(s) by mouth every 4 hours  -- Indication: For gas

## 2024-03-23 NOTE — DISCHARGE NOTE OB - CARE PROVIDER_API CALL
Atilio Albarran  Obstetrics and Gynecology  [address] [place] [id number]  Phone: [phone]  Fax: [phone]  Follow Up Time: 1 week

## 2024-03-23 NOTE — PROGRESS NOTE ADULT - ASSESSMENT
A/P: 33y yo P2 s/p rpt LTCS #2, EBL 1200, POD 0, recovering well   -ambulation encouraged  -PO hydration encouraged  -regular diet  -Incentive Spirometry encouraged  -pain management per routine  -UO adequate, blanc in until AM  -f/u 2000 CBC   -order lovenox if labs stable  -zofran    d/w Dr. Albarran and Dr. Sagastume  
33y yo P2 s/p rpt LTCS #2, EBL 1200, POD 1, recovering well     -ambulation encouraged  -PO hydration encouraged  -regular diet  -Incentive Spirometry encouraged  -pain management per routine  -TOV 1200  -f/u AM CBC   -lovenox orderd to start 24hr postpartum      to be made aware
33y yo P2 s/p rpt LTCS #2, EBL 1200, POD 2, with incisional pain, likely neuropathic, otherwise recovering well.     -ambulation encouraged  -PO hydration encouraged  -regular diet  -Incentive Spirometry encouraged  -pain management per routine  - DVT ppx: lovenox & SCDs  - gabapentin 100mg TID

## 2024-03-23 NOTE — DISCHARGE NOTE OB - PATIENT PORTAL LINK FT
You can access the FollowMyHealth Patient Portal offered by [facility] [place] by registering at the following website: [email or web address]. By joining [facility]’s FollowMyHealth portal, you will also be able to view your health information using other applications (apps) compatible with our system.

## 2024-03-23 NOTE — PROGRESS NOTE ADULT - SUBJECTIVE AND OBJECTIVE BOX
GALA WELLINGTON  33y  Female  CC: Postpartum  Patient seen and examined bedside. No overnight events. Pt endorses burning, sharp pain around incision that has not been resolved with PO pain meds. Denies heavy vaginal bleeding and reports pain well controlled. Has been OOB to chair without difficulty. Tolerating regular diet and passing flatus. Denies dizziness, lightheadedness, SOB, or palpitations. Denies fever, chills, nausea, vomiting. Breastfeeding.     PAST MEDICAL & SURGICAL HISTORY:  No pertinent past medical history  History of mastopexy  H/O dilation and curettage      Physical Exam  Vital Signs Last 24 Hrs  T(C): 36.8 (03-22-24 @ 23:47), Max: 36.8 (03-22-24 @ 23:47)  T(F): 98.2 (03-22-24 @ 23:47), Max: 98.2 (03-22-24 @ 23:47)  HR: 81 (03-22-24 @ 23:47) (74 - 97)  BP: 105/61 (03-22-24 @ 23:47) (105/61 - 119/61)  RR: 18 (03-22-24 @ 23:47) (16 - 18)    Gen: AAOx3, NAD  CV: RRR. No murmors gallops or rubs.  Pulm: CTAB. No wheezes or rales.  Ext: No calf tenderness, no swelling.   Abd: Soft, nontender, nondistended, BS+  Fundus firm, and below umbilicus. Nontender.   Lochia: Minimal, rubra  Wound: Pfannenstiel incision clean, dry intact. Steris in place. No surrounding edema or erythema.     Labs:                        11.9   15.89 )-----------( 177      ( 21 Mar 2024 21:06 )             35.8                         12.2   7.43  )-----------( 185      ( 21 Mar 2024 09:19 )             35.7        MEDICATIONS  (STANDING):  acetaminophen     Tablet .. 975 milliGRAM(s) Oral <User Schedule>  diphtheria/tetanus/pertussis (acellular) Vaccine (Adacel) 0.5 milliLiter(s) IntraMuscular once  enoxaparin Injectable 40 milliGRAM(s) SubCutaneous every 24 hours  ibuprofen  Tablet. 600 milliGRAM(s) Oral every 6 hours  ketorolac   Injectable 30 milliGRAM(s) IV Push every 6 hours  lactated ringers. 1000 milliLiter(s) (125 mL/Hr) IV Continuous <Continuous>  oxytocin Infusion 333.333 milliUNIT(s)/Min (1000 mL/Hr) IV Continuous <Continuous>  simethicone 80 milliGRAM(s) Chew every 4 hours    MEDICATIONS  (PRN):  diphenhydrAMINE 25 milliGRAM(s) Oral every 6 hours PRN Pruritus  lanolin Ointment 1 Application(s) Topical every 6 hours PRN Sore Nipples  magnesium hydroxide Suspension 30 milliLiter(s) Oral two times a day PRN Constipation  oxyCODONE    IR 5 milliGRAM(s) Oral every 3 hours PRN Severe Pain (7 - 10)  oxyCODONE    IR 5 milliGRAM(s) Oral once PRN Severe Pain (7 - 10)    
 GALA WELLINGTON  33y  Female    PGY2 Note:  Patient seen and examined bedside. Reports some nausea. Tolerating fluids, not yet eating. Denies heavy vaginal bleeding. Pain well controlled. Not yet ambulating. Eid, no flatus. Breastfeeding.     Physical Exam  Vital Signs Last 24 Hrs  T(C): 36.4 (21 Mar 2024 18:50), Max: 36.7 (21 Mar 2024 09:04)  T(F): 97.5 (21 Mar 2024 18:50), Max: 98 (21 Mar 2024 09:04)  HR: 92 (21 Mar 2024 18:50) (57 - 117)  BP: 137/76 (21 Mar 2024 18:50) (92/54 - 137/76)  BP(mean): --  RR: 18 (21 Mar 2024 18:50) (18 - 20)  SpO2: 97% (21 Mar 2024 17:35) (89% - 100%)    Gen: NAD, sitting comfortably  CV: RRR. No murmurs gallops or rubs.  Pulm: CTAB. No wheezes or rales.  Ext: No calf tenderness, no swelling.   Abd: Nondistended, soft, nontender, BS+, fundus firm, and below umbilicus.   Lochia: Minimal rubra  Wound: Dressing in place Pfannenstiel skin incision clean, dry intact. Steris in place. No surrounding edema or erythema.    PAST MEDICAL & SURGICAL HISTORY:  No pertinent past medical history  History of mastopexy  H/O dilation and curettage    Diet: regular    Labs:                        12.2   7.43  )-----------( 185      ( 21 Mar 2024 09:19 )             35.7          acetaminophen     Tablet .. 975 milliGRAM(s) Oral <User Schedule>  diphenhydrAMINE 25 milliGRAM(s) Oral every 6 hours PRN  diphtheria/tetanus/pertussis (acellular) Vaccine (Adacel) 0.5 milliLiter(s) IntraMuscular once  ibuprofen  Tablet. 600 milliGRAM(s) Oral every 6 hours  ketorolac   Injectable 30 milliGRAM(s) IV Push every 6 hours  lactated ringers. 1000 milliLiter(s) IV Continuous <Continuous>  lanolin Ointment 1 Application(s) Topical every 6 hours PRN  magnesium hydroxide Suspension 30 milliLiter(s) Oral two times a day PRN  ondansetron Injectable 4 milliGRAM(s) IV Push once  oxyCODONE    IR 5 milliGRAM(s) Oral every 3 hours PRN  oxyCODONE    IR 5 milliGRAM(s) Oral once PRN  oxytocin Infusion 333.333 milliUNIT(s)/Min IV Continuous <Continuous>  simethicone 80 milliGRAM(s) Chew every 4 hours      
GALA WELLINGTON  33y  Female  CC: Postpartum  PGY4 Note:  Patient seen and examined bedside. No overnight events. Denies heavy vaginal bleeding and reports pain well controlled. Has been OOB to chair without difficulty. Tolerating regular diet. Denies flatus. Denies dizziness, lightheadedness, SOB, or palpitations. Denies fever, chills, nausea, vomiting. Breastfeeding.     PAST MEDICAL & SURGICAL HISTORY:  No pertinent past medical history      History of mastopexy      H/O dilation and curettage          Physical Exam  Vital Signs Last 24 Hrs  T(C): 36.6 (22 Mar 2024 03:12), Max: 36.8 (21 Mar 2024 23:35)  T(F): 97.9 (22 Mar 2024 03:12), Max: 98.2 (21 Mar 2024 23:35)  HR: 71 (22 Mar 2024 03:12) (57 - 117)  BP: 94/54 (22 Mar 2024 03:12) (92/54 - 137/76)  RR: 18 (22 Mar 2024 03:12) (16 - 20)  SpO2: 97% (21 Mar 2024 17:35) (89% - 100%)      Gen: AAOx3, NAD  CV: RRR. No murmors gallops or rubs.  Pulm: CTAB. No wheezes or rales.  Ext: No calf tenderness, no swelling.   Abd: Soft, nontender, nondistended, BS+  Fundus firm, and below umbilicus. Nontender.   Lochia: Minimal, rubra  Wound: Pfannenstiel incision clean, dry intact. Steris in place. No surrounding edema or erythema.     Urine output: [id number]    Labs:                        11.9   15.89 )-----------( 177      ( 21 Mar 2024 21:06 )             35.8                         12.2   7.43  )-----------( 185      ( 21 Mar 2024 09:19 )             35.7        MEDICATIONS  (STANDING):  acetaminophen     Tablet .. 975 milliGRAM(s) Oral <User Schedule>  diphtheria/tetanus/pertussis (acellular) Vaccine (Adacel) 0.5 milliLiter(s) IntraMuscular once  enoxaparin Injectable 40 milliGRAM(s) SubCutaneous every 24 hours  ibuprofen  Tablet. 600 milliGRAM(s) Oral every 6 hours  ketorolac   Injectable 30 milliGRAM(s) IV Push every 6 hours  lactated ringers. 1000 milliLiter(s) (125 mL/Hr) IV Continuous <Continuous>  oxytocin Infusion 333.333 milliUNIT(s)/Min (1000 mL/Hr) IV Continuous <Continuous>  simethicone 80 milliGRAM(s) Chew every 4 hours    MEDICATIONS  (PRN):  diphenhydrAMINE 25 milliGRAM(s) Oral every 6 hours PRN Pruritus  lanolin Ointment 1 Application(s) Topical every 6 hours PRN Sore Nipples  magnesium hydroxide Suspension 30 milliLiter(s) Oral two times a day PRN Constipation  oxyCODONE    IR 5 milliGRAM(s) Oral every 3 hours PRN Severe Pain (7 - 10)  oxyCODONE    IR 5 milliGRAM(s) Oral once PRN Severe Pain (7 - 10)

## 2024-03-23 NOTE — DISCHARGE NOTE OB - CARE PLAN
Principal Discharge DX:	S/P  section  Assessment and plan of treatment:	Keep incisions clean and dry. No heavy lifting x4 weeks. Nothing in the vagina for 6 weeks - no sex, tampons, douching, tub baths or pools. May Shower. If you have a fever over 100.4F, severe pain or severe bleeding, please call your doctor or visit the emergency room.     Follow up in 1 week for incision check and 6 weeks for postpartum check with your doctor.   1

## 2024-03-23 NOTE — DISCHARGE NOTE OB - HOSPITAL COURSE
Patient underwent an uncomplicated repeat LTCS #2. EBL 1200, H/H as below. Patient’s postoperative course was unremarkable and she remained hemodynamically stable and afebrile throughout. Upon discharge on POD2, the patient is ambulating and voiding spontaneously, tolerating oral intake, pain was well controlled with oral medication, and vital signs were stable.                        10.6   11.72 )-----------( 165      ( 22 Mar 2024 08:18 )             32.2

## 2024-03-25 LAB — SURGICAL PATHOLOGY STUDY: SIGNIFICANT CHANGE UP

## 2024-03-29 DIAGNOSIS — D25.2 SUBSEROSAL LEIOMYOMA OF UTERUS: ICD-10-CM

## 2024-03-29 DIAGNOSIS — O34.13 MATERNAL CARE FOR BENIGN TUMOR OF CORPUS UTERI, THIRD TRIMESTER: ICD-10-CM

## 2024-03-29 DIAGNOSIS — Z34.83 ENCOUNTER FOR SUPERVISION OF OTHER NORMAL PREGNANCY, THIRD TRIMESTER: ICD-10-CM

## 2024-03-29 DIAGNOSIS — G62.9 POLYNEUROPATHY, UNSPECIFIED: ICD-10-CM

## 2024-03-29 DIAGNOSIS — O36.5930 MATERNAL CARE FOR OTHER KNOWN OR SUSPECTED POOR FETAL GROWTH, THIRD TRIMESTER, NOT APPLICABLE OR UNSPECIFIED: ICD-10-CM

## 2024-03-29 DIAGNOSIS — Z3A.38 38 WEEKS GESTATION OF PREGNANCY: ICD-10-CM

## 2024-03-29 DIAGNOSIS — O34.219 MATERNAL CARE FOR UNSPECIFIED TYPE SCAR FROM PREVIOUS CESAREAN DELIVERY: ICD-10-CM

## 2024-05-16 ENCOUNTER — EMERGENCY (EMERGENCY)
Facility: HOSPITAL | Age: 34
LOS: 0 days | Discharge: HOME | End: 2024-05-17
Attending: EMERGENCY MEDICINE
Payer: COMMERCIAL

## 2024-05-16 VITALS
RESPIRATION RATE: 18 BRPM | HEIGHT: 66 IN | WEIGHT: 214.95 LBS | OXYGEN SATURATION: 98 % | TEMPERATURE: 98 F | SYSTOLIC BLOOD PRESSURE: 139 MMHG | DIASTOLIC BLOOD PRESSURE: 82 MMHG | HEART RATE: 70 BPM

## 2024-05-16 DIAGNOSIS — N93.9 ABNORMAL UTERINE AND VAGINAL BLEEDING, UNSPECIFIED: ICD-10-CM

## 2024-05-16 DIAGNOSIS — Z98.890 OTHER SPECIFIED POSTPROCEDURAL STATES: Chronic | ICD-10-CM

## 2024-05-16 LAB
ALBUMIN SERPL ELPH-MCNC: 4.4 G/DL — SIGNIFICANT CHANGE UP (ref 3.5–5.2)
ALP SERPL-CCNC: 107 U/L — SIGNIFICANT CHANGE UP (ref 30–115)
ALT FLD-CCNC: 20 U/L — SIGNIFICANT CHANGE UP (ref 0–41)
ANION GAP SERPL CALC-SCNC: 11 MMOL/L — SIGNIFICANT CHANGE UP (ref 7–14)
APPEARANCE UR: CLEAR — SIGNIFICANT CHANGE UP
APTT BLD: 33.9 SEC — SIGNIFICANT CHANGE UP (ref 27–39.2)
AST SERPL-CCNC: 23 U/L — SIGNIFICANT CHANGE UP (ref 0–41)
BASOPHILS # BLD AUTO: 0.03 K/UL — SIGNIFICANT CHANGE UP (ref 0–0.2)
BASOPHILS NFR BLD AUTO: 0.4 % — SIGNIFICANT CHANGE UP (ref 0–1)
BILIRUB SERPL-MCNC: 0.2 MG/DL — SIGNIFICANT CHANGE UP (ref 0.2–1.2)
BILIRUB UR-MCNC: NEGATIVE — SIGNIFICANT CHANGE UP
BUN SERPL-MCNC: 13 MG/DL — SIGNIFICANT CHANGE UP (ref 10–20)
CALCIUM SERPL-MCNC: 9.7 MG/DL — SIGNIFICANT CHANGE UP (ref 8.4–10.5)
CHLORIDE SERPL-SCNC: 101 MMOL/L — SIGNIFICANT CHANGE UP (ref 98–110)
CO2 SERPL-SCNC: 26 MMOL/L — SIGNIFICANT CHANGE UP (ref 17–32)
COLOR SPEC: YELLOW — SIGNIFICANT CHANGE UP
CREAT SERPL-MCNC: 0.8 MG/DL — SIGNIFICANT CHANGE UP (ref 0.7–1.5)
DIFF PNL FLD: ABNORMAL
EGFR: 100 ML/MIN/1.73M2 — SIGNIFICANT CHANGE UP
EOSINOPHIL # BLD AUTO: 0.09 K/UL — SIGNIFICANT CHANGE UP (ref 0–0.7)
EOSINOPHIL NFR BLD AUTO: 1.3 % — SIGNIFICANT CHANGE UP (ref 0–8)
GLUCOSE SERPL-MCNC: 80 MG/DL — SIGNIFICANT CHANGE UP (ref 70–99)
GLUCOSE UR QL: NEGATIVE MG/DL — SIGNIFICANT CHANGE UP
HCG SERPL-ACNC: <1 MIU/ML — SIGNIFICANT CHANGE UP
HCT VFR BLD CALC: 39.9 % — SIGNIFICANT CHANGE UP (ref 37–47)
HGB BLD-MCNC: 13.1 G/DL — SIGNIFICANT CHANGE UP (ref 12–16)
IMM GRANULOCYTES NFR BLD AUTO: 0.1 % — SIGNIFICANT CHANGE UP (ref 0.1–0.3)
INR BLD: 1.03 RATIO — SIGNIFICANT CHANGE UP (ref 0.65–1.3)
KETONES UR-MCNC: NEGATIVE MG/DL — SIGNIFICANT CHANGE UP
LEUKOCYTE ESTERASE UR-ACNC: NEGATIVE — SIGNIFICANT CHANGE UP
LIDOCAIN IGE QN: 22 U/L — SIGNIFICANT CHANGE UP (ref 7–60)
LYMPHOCYTES # BLD AUTO: 3.06 K/UL — SIGNIFICANT CHANGE UP (ref 1.2–3.4)
LYMPHOCYTES # BLD AUTO: 42.7 % — SIGNIFICANT CHANGE UP (ref 20.5–51.1)
MCHC RBC-ENTMCNC: 29.9 PG — SIGNIFICANT CHANGE UP (ref 27–31)
MCHC RBC-ENTMCNC: 32.8 G/DL — SIGNIFICANT CHANGE UP (ref 32–37)
MCV RBC AUTO: 91.1 FL — SIGNIFICANT CHANGE UP (ref 81–99)
MONOCYTES # BLD AUTO: 0.51 K/UL — SIGNIFICANT CHANGE UP (ref 0.1–0.6)
MONOCYTES NFR BLD AUTO: 7.1 % — SIGNIFICANT CHANGE UP (ref 1.7–9.3)
NEUTROPHILS # BLD AUTO: 3.47 K/UL — SIGNIFICANT CHANGE UP (ref 1.4–6.5)
NEUTROPHILS NFR BLD AUTO: 48.4 % — SIGNIFICANT CHANGE UP (ref 42.2–75.2)
NITRITE UR-MCNC: NEGATIVE — SIGNIFICANT CHANGE UP
NRBC # BLD: 0 /100 WBCS — SIGNIFICANT CHANGE UP (ref 0–0)
PH UR: 8 — SIGNIFICANT CHANGE UP (ref 5–8)
PLATELET # BLD AUTO: 311 K/UL — SIGNIFICANT CHANGE UP (ref 130–400)
PMV BLD: 10.1 FL — SIGNIFICANT CHANGE UP (ref 7.4–10.4)
POTASSIUM SERPL-MCNC: 4.2 MMOL/L — SIGNIFICANT CHANGE UP (ref 3.5–5)
POTASSIUM SERPL-SCNC: 4.2 MMOL/L — SIGNIFICANT CHANGE UP (ref 3.5–5)
PROT SERPL-MCNC: 7.7 G/DL — SIGNIFICANT CHANGE UP (ref 6–8)
PROT UR-MCNC: NEGATIVE MG/DL — SIGNIFICANT CHANGE UP
PROTHROM AB SERPL-ACNC: 11.7 SEC — SIGNIFICANT CHANGE UP (ref 9.95–12.87)
RBC # BLD: 4.38 M/UL — SIGNIFICANT CHANGE UP (ref 4.2–5.4)
RBC # FLD: 12.3 % — SIGNIFICANT CHANGE UP (ref 11.5–14.5)
SODIUM SERPL-SCNC: 138 MMOL/L — SIGNIFICANT CHANGE UP (ref 135–146)
SP GR SPEC: 1.01 — SIGNIFICANT CHANGE UP (ref 1–1.03)
UROBILINOGEN FLD QL: 0.2 MG/DL — SIGNIFICANT CHANGE UP (ref 0.2–1)
WBC # BLD: 7.17 K/UL — SIGNIFICANT CHANGE UP (ref 4.8–10.8)
WBC # FLD AUTO: 7.17 K/UL — SIGNIFICANT CHANGE UP (ref 4.8–10.8)

## 2024-05-16 PROCEDURE — 85730 THROMBOPLASTIN TIME PARTIAL: CPT

## 2024-05-16 PROCEDURE — 83690 ASSAY OF LIPASE: CPT

## 2024-05-16 PROCEDURE — 84702 CHORIONIC GONADOTROPIN TEST: CPT

## 2024-05-16 PROCEDURE — 81001 URINALYSIS AUTO W/SCOPE: CPT

## 2024-05-16 PROCEDURE — 85610 PROTHROMBIN TIME: CPT

## 2024-05-16 PROCEDURE — 36415 COLL VENOUS BLD VENIPUNCTURE: CPT

## 2024-05-16 PROCEDURE — 99284 EMERGENCY DEPT VISIT MOD MDM: CPT

## 2024-05-16 PROCEDURE — 85025 COMPLETE CBC W/AUTO DIFF WBC: CPT

## 2024-05-16 PROCEDURE — 86900 BLOOD TYPING SEROLOGIC ABO: CPT

## 2024-05-16 PROCEDURE — 36000 PLACE NEEDLE IN VEIN: CPT

## 2024-05-16 PROCEDURE — 86901 BLOOD TYPING SEROLOGIC RH(D): CPT

## 2024-05-16 PROCEDURE — 86850 RBC ANTIBODY SCREEN: CPT

## 2024-05-16 PROCEDURE — 80053 COMPREHEN METABOLIC PANEL: CPT

## 2024-05-16 PROCEDURE — 76830 TRANSVAGINAL US NON-OB: CPT

## 2024-05-16 PROCEDURE — 99284 EMERGENCY DEPT VISIT MOD MDM: CPT | Mod: 25

## 2024-05-16 RX ORDER — ACETAMINOPHEN 500 MG
650 TABLET ORAL ONCE
Refills: 0 | Status: COMPLETED | OUTPATIENT
Start: 2024-05-16 | End: 2024-05-16

## 2024-05-16 NOTE — ED ADULT TRIAGE NOTE - CHIEF COMPLAINT QUOTE
Patient reports had c section x 8 weeks ago co vaginal bleeding since last night with pelvic pressure

## 2024-05-17 PROCEDURE — 76830 TRANSVAGINAL US NON-OB: CPT | Mod: 26

## 2024-05-17 NOTE — ED PROVIDER NOTE - PROGRESS NOTE DETAILS
Labs unremarkable.  H&H normal.  Ultrasound shows no acute pathology.  Patient is stable for discharge.  Will have patient follow-up with her GYN outpatient

## 2024-05-17 NOTE — ED PROVIDER NOTE - PHYSICAL EXAMINATION
CONSTITUTIONAL: in no apparent distress.   HEAD: Normocephalic; atraumatic.   EYES: Pupils are round and reactive, extra-ocular muscles are intact. Eyelids are normal in appearance without swelling or lesions.   ENT: Hearing is intact with good acuity to spoken voice.  Patient is speaking clearly, not muffled and airway is intact.   RESPIRATORY: No signs of respiratory distress. Lung sounds are clear in all lobes bilaterally without rales, rhonchi, or wheezes.  CARDIOVASCULAR: Regular rate and rhythm.   GI: tender to palpation in suprapubic area. Abdomen is soft, and without distention. Bowel sounds are present and normoactive in all four quadrants. No masses are noted.   PELVIC: Chaperone: Dr. Le. External genitalia without erythema, lesion, or mass. Blood noticed in vaginal canal.   BACK: No evidence of trauma or deformity. No CVA tenderness bilaterally. Normal ROM.   NEURO: A & O x 3. Normal speech. No focal deficit.  PSYCHOLOGICAL: Appropriate mood and affect. Good judgement and insight.

## 2024-05-17 NOTE — ED PROVIDER NOTE - OBJECTIVE STATEMENT
33-year-old female with no significant past medical history who presents to the ED for evaluation. Reports that she had a  8 weeks ago and started noticing vaginal bleeding last night.  Reports that she had to replace her pads twice today.  Also endorses suprapubic pressure and sensation that started around same time.  Denies fever, shortness of breath, chest pain, nausea, vomiting, abdominal pain, urinary symptoms, and change with bowel movement.

## 2024-05-17 NOTE — ED PROVIDER NOTE - PATIENT PORTAL LINK FT
You can access the FollowMyHealth Patient Portal offered by [facility] by registering at the following website: [email or web address]. By joining [employer]’s FollowMyHealth portal, you will also be able to view your health information using other applications (apps) compatible with our system.

## 2024-05-17 NOTE — ED PROVIDER NOTE - ATTENDING APP SHARED VISIT CONTRIBUTION OF CARE
I personally evaluated the patient. I reviewed the Resident’s or Physician Assistant’s note (as assigned above), and agree with the findings and plan except as documented in my note.  33-year-old female with no significant past medical history who presents to the ED for evaluation. Reports that she had a  8 weeks ago and started noticing vaginal bleeding last night.  Denies fever, shortness of breath, chest pain, nausea, vomiting, abdominal pain, urinary symptoms, and change with bowel movement. VS reviewed, pt non-toxic appearing, NAD. Head ncat, PERRLA, EOMI, MMM, pharyngeal exam w/o erythema, edema or exudates. B/l TM wnl. neck supple, normal ROM, normal s1s2 without any murmurs, Lungs CTAB with normal work of breathing. abd +BS, s/nd, mild suprapubic discomfort, extremities wnl, neurovascularly intact, neuro exam grossly normal. No acute skin rashes. Plan is labs, imaging, meds as needed and reassess.

## 2024-05-17 NOTE — ED PROVIDER NOTE - CLINICAL SUMMARY MEDICAL DECISION MAKING FREE TEXT BOX
Pt 8 wks post c setion with suprapubic abd pain and vaginal bleeding. Required labs, ua and meds. No acute findings. Will dc with outpt f/up.

## 2024-05-17 NOTE — ED ADULT NURSE NOTE - NSFALLUNIVINTERV_ED_ALL_ED
Bed/Stretcher in lowest position, wheels locked, appropriate side rails in place/Call bell, personal items and telephone in reach/Instruct patient to call for assistance before getting out of bed/chair/stretcher/Non-slip footwear applied when patient is off stretcher/[place] to call system/Physically safe environment - no spills, clutter or unnecessary equipment/Purposeful proactive rounding/Room/bathroom lighting operational, light cord in reach

## 2024-06-06 ENCOUNTER — NON-APPOINTMENT (OUTPATIENT)
Age: 34
End: 2024-06-06

## 2024-09-24 ENCOUNTER — APPOINTMENT (OUTPATIENT)
Dept: PLASTIC SURGERY | Facility: CLINIC | Age: 34
End: 2024-09-24

## 2025-06-12 ENCOUNTER — NON-APPOINTMENT (OUTPATIENT)
Age: 35
End: 2025-06-12

## 2025-06-13 ENCOUNTER — APPOINTMENT (OUTPATIENT)
Dept: OBGYN | Facility: CLINIC | Age: 35
End: 2025-06-13
Payer: COMMERCIAL

## 2025-06-13 VITALS
SYSTOLIC BLOOD PRESSURE: 122 MMHG | BODY MASS INDEX: 28.45 KG/M2 | WEIGHT: 177 LBS | HEIGHT: 66 IN | DIASTOLIC BLOOD PRESSURE: 70 MMHG

## 2025-06-13 PROBLEM — Z34.90 PREGNANCY: Status: ACTIVE | Noted: 2025-06-13

## 2025-06-13 PROCEDURE — 99202 OFFICE O/P NEW SF 15 MIN: CPT

## 2025-06-24 ENCOUNTER — APPOINTMENT (OUTPATIENT)
Dept: OBGYN | Facility: CLINIC | Age: 35
End: 2025-06-24

## 2025-07-15 ENCOUNTER — APPOINTMENT (OUTPATIENT)
Dept: OBGYN | Facility: CLINIC | Age: 35
End: 2025-07-15
Payer: COMMERCIAL

## 2025-07-16 ENCOUNTER — APPOINTMENT (OUTPATIENT)
Dept: OBGYN | Facility: CLINIC | Age: 35
End: 2025-07-16
Payer: COMMERCIAL

## 2025-07-16 PROCEDURE — 0502F SUBSEQUENT PRENATAL CARE: CPT

## 2025-08-09 ENCOUNTER — LABORATORY RESULT (OUTPATIENT)
Age: 35
End: 2025-08-09

## 2025-08-13 ENCOUNTER — APPOINTMENT (OUTPATIENT)
Dept: OBGYN | Facility: CLINIC | Age: 35
End: 2025-08-13
Payer: COMMERCIAL

## 2025-08-13 VITALS
SYSTOLIC BLOOD PRESSURE: 109 MMHG | BODY MASS INDEX: 31.5 KG/M2 | WEIGHT: 196 LBS | HEART RATE: 82 BPM | DIASTOLIC BLOOD PRESSURE: 77 MMHG | HEIGHT: 66 IN

## 2025-08-13 PROCEDURE — 0502F SUBSEQUENT PRENATAL CARE: CPT

## 2025-09-16 ENCOUNTER — APPOINTMENT (OUTPATIENT)
Dept: OBGYN | Facility: CLINIC | Age: 35
End: 2025-09-16
Payer: COMMERCIAL

## 2025-09-16 VITALS
HEIGHT: 66 IN | WEIGHT: 210 LBS | DIASTOLIC BLOOD PRESSURE: 86 MMHG | BODY MASS INDEX: 33.75 KG/M2 | SYSTOLIC BLOOD PRESSURE: 120 MMHG

## 2025-09-16 LAB
APPEARANCE: CLEAR
BILIRUBIN URINE: NEGATIVE
BLOOD URINE: NEGATIVE
COLOR: YELLOW
GLUCOSE QUALITATIVE U: NEGATIVE
KETONES URINE: NEGATIVE
LEUKOCYTE ESTERASE URINE: NEGATIVE
NITRITE URINE: NEGATIVE
PH URINE: 7.5
PROTEIN URINE: NEGATIVE
SPECIFIC GRAVITY URINE: 1.01
UROBILINOGEN URINE: 0.2 (ref 0.2–?)

## 2025-09-16 PROCEDURE — 0502F SUBSEQUENT PRENATAL CARE: CPT
